# Patient Record
Sex: FEMALE | Race: WHITE | NOT HISPANIC OR LATINO | Employment: FULL TIME | URBAN - METROPOLITAN AREA
[De-identification: names, ages, dates, MRNs, and addresses within clinical notes are randomized per-mention and may not be internally consistent; named-entity substitution may affect disease eponyms.]

---

## 2018-09-10 ENCOUNTER — OFFICE VISIT (OUTPATIENT)
Dept: MEDICAL GROUP | Facility: PHYSICIAN GROUP | Age: 23
End: 2018-09-10
Payer: COMMERCIAL

## 2018-09-10 VITALS
TEMPERATURE: 96.6 F | HEART RATE: 77 BPM | HEIGHT: 63 IN | BODY MASS INDEX: 31.89 KG/M2 | RESPIRATION RATE: 12 BRPM | SYSTOLIC BLOOD PRESSURE: 102 MMHG | OXYGEN SATURATION: 97 % | WEIGHT: 180 LBS | DIASTOLIC BLOOD PRESSURE: 60 MMHG

## 2018-09-10 DIAGNOSIS — E66.9 OBESITY (BMI 30-39.9): ICD-10-CM

## 2018-09-10 DIAGNOSIS — J40 BRONCHITIS: ICD-10-CM

## 2018-09-10 PROCEDURE — 99204 OFFICE O/P NEW MOD 45 MIN: CPT | Performed by: FAMILY MEDICINE

## 2018-09-10 RX ORDER — BENZONATATE 100 MG/1
100 CAPSULE ORAL 3 TIMES DAILY PRN
Qty: 60 CAP | Refills: 0 | Status: SHIPPED | OUTPATIENT
Start: 2018-09-10 | End: 2020-02-18

## 2018-09-10 RX ORDER — DOXYCYCLINE HYCLATE 100 MG
100 TABLET ORAL 2 TIMES DAILY
Qty: 20 TAB | Refills: 0 | Status: SHIPPED | OUTPATIENT
Start: 2018-09-10 | End: 2020-03-03

## 2018-09-10 RX ORDER — METHYLPREDNISOLONE 4 MG/1
TABLET ORAL
Qty: 21 TAB | Refills: 0 | Status: SHIPPED | OUTPATIENT
Start: 2018-09-10 | End: 2020-02-18

## 2018-09-10 RX ORDER — ALBUTEROL SULFATE 90 UG/1
2 AEROSOL, METERED RESPIRATORY (INHALATION) EVERY 6 HOURS PRN
COMMUNITY
End: 2018-10-09 | Stop reason: SDUPTHER

## 2018-09-10 ASSESSMENT — ENCOUNTER SYMPTOMS
GASTROINTESTINAL NEGATIVE: 1
BLURRED VISION: 0
DOUBLE VISION: 0
HEMOPTYSIS: 0
HEARTBURN: 0
SHORTNESS OF BREATH: 1
DIZZINESS: 0
NEUROLOGICAL NEGATIVE: 1
CARDIOVASCULAR NEGATIVE: 1
MYALGIAS: 0
WHEEZING: 1
BRUISES/BLEEDS EASILY: 0
CHILLS: 0
FEVER: 0
COUGH: 1
HEADACHES: 0
MUSCULOSKELETAL NEGATIVE: 1
NAUSEA: 0
PALPITATIONS: 0
DEPRESSION: 0
RHINORRHEA: 1
CONSTITUTIONAL NEGATIVE: 1
TINGLING: 0
PSYCHIATRIC NEGATIVE: 1
EYES NEGATIVE: 1

## 2018-09-10 ASSESSMENT — PATIENT HEALTH QUESTIONNAIRE - PHQ9: CLINICAL INTERPRETATION OF PHQ2 SCORE: 0

## 2018-09-10 NOTE — LETTER
[facility]     September 10, 2018    Patient: Zaynab Galarza   YOB: 1995   Date of Visit: 9/10/2018       To Whom It May Concern:    Zaynab Galarza was seen and treated in our department on     9/10/2018. Please excuse Zaynab from work on 09/10/18 until     09/12/18. Zaynab is able to return on 09/12/18 if feeling better,     If not then return date will be no later than 09/13/18.        Sincerely,           Dr. Broderick Stevens

## 2018-09-11 NOTE — PROGRESS NOTES
Subjective:      Zaynab Galarza is a 23 y.o. female who presents with Pharyngitis and Cold Exposure            History of asthma now with productive cough will treat    1. Bronchitis    - albuterol 108 (90 Base) MCG/ACT Aero Soln inhalation aerosol; Inhale 2 Puffs by mouth every 6 hours as needed for Shortness of Breath.  - doxycycline (VIBRAMYCIN) 100 MG Tab; Take 1 Tab by mouth 2 times a day.  Dispense: 20 Tab; Refill: 0  - MethylPREDNISolone (MEDROL DOSEPAK) 4 MG Tablet Therapy Pack; As directed on the packaging label.  Dispense: 21 Tab; Refill: 0  - benzonatate (TESSALON) 100 MG Cap; Take 1 Cap by mouth 3 times a day as needed for Cough.  Dispense: 60 Cap; Refill: 0    2. Obesity (BMI 30-39.9)    - Patient identified as having weight management issue.  Appropriate orders and counseling given.    Past Medical History:  No date: Asthma  Past Surgical History:  No date: TONSILLECTOMY  Smoking status: Never Smoker                                                              Smokeless tobacco: Never Used                      Alcohol use: No              History reviewed.  No pertinent family history.      Current Outpatient Prescriptions: •  albuterol 108 (90 Base) MCG/ACT Aero Soln inhalation aerosol, Inhale 2 Puffs by mouth every 6 hours as needed for Shortness of Breath., Disp: , Rfl: •  doxycycline (VIBRAMYCIN) 100 MG Tab, Take 1 Tab by mouth 2 times a day., Disp: 20 Tab, Rfl: 0•  MethylPREDNISolone (MEDROL DOSEPAK) 4 MG Tablet Therapy Pack, As directed on the packaging label., Disp: 21 Tab, Rfl: 0•  benzonatate (TESSALON) 100 MG Cap, Take 1 Cap by mouth 3 times a day as needed for Cough., Disp: 60 Cap, Rfl: 0    Patient was instructed on the use of medications, either prescriptions or OTC and informed on when the appropriate follow up time period should be. In addition, patient was also instructed that should any acute worsening occur that they should notify this clinic asap or call 911.          Cough   This is  "a recurrent problem. The current episode started in the past 7 days. The problem has been unchanged. The problem occurs every few minutes. The cough is productive of sputum. Associated symptoms include rhinorrhea, shortness of breath and wheezing. Pertinent negatives include no chest pain, chills, fever, headaches, heartburn, hemoptysis, myalgias or rash. Nothing aggravates the symptoms. She has tried a beta-agonist inhaler for the symptoms. The treatment provided mild relief. Her past medical history is significant for asthma.       Review of Systems   Constitutional: Negative.  Negative for chills and fever.   HENT: Positive for rhinorrhea. Negative for hearing loss.    Eyes: Negative.  Negative for blurred vision and double vision.   Respiratory: Positive for cough, shortness of breath and wheezing. Negative for hemoptysis.    Cardiovascular: Negative.  Negative for chest pain and palpitations.   Gastrointestinal: Negative.  Negative for heartburn and nausea.   Genitourinary: Negative.  Negative for dysuria.   Musculoskeletal: Negative.  Negative for myalgias.   Skin: Negative.  Negative for rash.   Neurological: Negative.  Negative for dizziness, tingling and headaches.   Endo/Heme/Allergies: Negative.  Does not bruise/bleed easily.   Psychiatric/Behavioral: Negative.  Negative for depression and suicidal ideas.   All other systems reviewed and are negative.         Objective:     /60   Pulse 77   Temp 35.9 °C (96.6 °F)   Resp 12   Ht 1.6 m (5' 3\")   Wt 81.6 kg (180 lb)   SpO2 97%   BMI 31.89 kg/m²      Physical Exam   Constitutional: She is oriented to person, place, and time. She appears well-developed and well-nourished. No distress.   HENT:   Head: Normocephalic and atraumatic.   Mouth/Throat: Oropharynx is clear and moist. No oropharyngeal exudate.   Eyes: Pupils are equal, round, and reactive to light.   Cardiovascular: Normal rate, regular rhythm, normal heart sounds and intact distal pulses. "  Exam reveals no gallop and no friction rub.    No murmur heard.  Pulmonary/Chest: Effort normal and breath sounds normal. No respiratory distress. She has no wheezes. She has no rales. She exhibits no tenderness.   Patient today has a loose cough but on auscultation has no evident wheezes,rales or rhonchi     Neurological: She is alert and oriented to person, place, and time.   Skin: She is not diaphoretic.   Psychiatric: She has a normal mood and affect. Her behavior is normal. Judgment and thought content normal.   Nursing note and vitals reviewed.              Assessment/Plan:     1. Bronchitis    - albuterol 108 (90 Base) MCG/ACT Aero Soln inhalation aerosol; Inhale 2 Puffs by mouth every 6 hours as needed for Shortness of Breath.  - doxycycline (VIBRAMYCIN) 100 MG Tab; Take 1 Tab by mouth 2 times a day.  Dispense: 20 Tab; Refill: 0  - MethylPREDNISolone (MEDROL DOSEPAK) 4 MG Tablet Therapy Pack; As directed on the packaging label.  Dispense: 21 Tab; Refill: 0  - benzonatate (TESSALON) 100 MG Cap; Take 1 Cap by mouth 3 times a day as needed for Cough.  Dispense: 60 Cap; Refill: 0    2. Obesity (BMI 30-39.9)    - Patient identified as having weight management issue.  Appropriate orders and counseling given.

## 2018-09-13 ENCOUNTER — HOSPITAL ENCOUNTER (OUTPATIENT)
Dept: HOSPITAL 8 - CFH | Age: 23
Discharge: HOME | End: 2018-09-13
Payer: COMMERCIAL

## 2018-09-13 DIAGNOSIS — N83.201: Primary | ICD-10-CM

## 2018-09-13 PROCEDURE — 76830 TRANSVAGINAL US NON-OB: CPT

## 2018-10-09 ENCOUNTER — OFFICE VISIT (OUTPATIENT)
Dept: MEDICAL GROUP | Facility: MEDICAL CENTER | Age: 23
End: 2018-10-09
Payer: COMMERCIAL

## 2018-10-09 VITALS
HEART RATE: 70 BPM | TEMPERATURE: 97.2 F | SYSTOLIC BLOOD PRESSURE: 100 MMHG | HEIGHT: 63 IN | WEIGHT: 178.79 LBS | BODY MASS INDEX: 31.68 KG/M2 | DIASTOLIC BLOOD PRESSURE: 66 MMHG | OXYGEN SATURATION: 95 %

## 2018-10-09 DIAGNOSIS — J40 BRONCHITIS: ICD-10-CM

## 2018-10-09 DIAGNOSIS — Z23 NEED FOR INFLUENZA VACCINATION: ICD-10-CM

## 2018-10-09 DIAGNOSIS — J45.31 MILD PERSISTENT ASTHMA WITH ACUTE EXACERBATION: ICD-10-CM

## 2018-10-09 PROCEDURE — 90686 IIV4 VACC NO PRSV 0.5 ML IM: CPT | Performed by: PHYSICIAN ASSISTANT

## 2018-10-09 PROCEDURE — 99214 OFFICE O/P EST MOD 30 MIN: CPT | Mod: 25 | Performed by: PHYSICIAN ASSISTANT

## 2018-10-09 PROCEDURE — 90471 IMMUNIZATION ADMIN: CPT | Performed by: PHYSICIAN ASSISTANT

## 2018-10-09 RX ORDER — NORETHINDRONE ACETATE AND ETHINYL ESTRADIOL, AND FERROUS FUMARATE 1MG-20(24)
KIT ORAL
COMMUNITY
End: 2020-03-13

## 2018-10-09 RX ORDER — ALBUTEROL SULFATE 90 UG/1
2 AEROSOL, METERED RESPIRATORY (INHALATION) EVERY 6 HOURS PRN
Qty: 8.5 G | Refills: 11 | Status: SHIPPED
Start: 2018-10-09 | End: 2020-02-18

## 2018-10-09 RX ORDER — MONTELUKAST SODIUM 10 MG/1
10 TABLET ORAL DAILY
Qty: 60 TAB | Refills: 5 | Status: SHIPPED | OUTPATIENT
Start: 2018-10-09 | End: 2020-03-13

## 2018-10-09 RX ORDER — ALBUTEROL SULFATE 2.5 MG/3ML
2.5 SOLUTION RESPIRATORY (INHALATION) EVERY 4 HOURS PRN
Qty: 30 BULLET | Refills: 2 | Status: SHIPPED
Start: 2018-10-09 | End: 2020-02-18

## 2018-10-09 NOTE — PROGRESS NOTES
Subjective:   CC:   Chief Complaint   Patient presents with   • Establish Care     Recurring Bronchitis, completed abx, discuss treatment       Zaynab Galarza is a 23 y.o. female here today for establishing care. She works at a days.      HPI:  Patient is here to discuss asthma bronchitis which are new problems to me.  She has asthma and is currently taking albuterol  1-2 times a day 3-5 days wk. She feels chest tightness and not being able to grasp enough air and therefore she uses her inhaler.  States exercising and cold makes her symptoms worse.  She also gets bronchitis about 4 times a year.  No chest tightness.  She also has a  Nebulizer at home that uses with asthma exacerbation.Her mom is a respiratory therapist.          current medicines (including changes today)  Current Outpatient Prescriptions   Medication Sig Dispense Refill   • Norethin Ace-Eth Estrad-FE (TAYTULLA) 1-20 MG-MCG(24) Cap Take  by mouth.     • montelukast (SINGULAIR) 10 MG Tab Take 1 Tab by mouth every day. 60 Tab 5   • albuterol (PROVENTIL) 2.5mg/3ml Nebu Soln solution for nebulization 3 mL by Nebulization route every four hours as needed for Shortness of Breath. 30 Bullet 2   • albuterol 108 (90 Base) MCG/ACT Aero Soln inhalation aerosol Inhale 2 Puffs by mouth every 6 hours as needed for Shortness of Breath. 8.5 g 11   • doxycycline (VIBRAMYCIN) 100 MG Tab Take 1 Tab by mouth 2 times a day. 20 Tab 0   • MethylPREDNISolone (MEDROL DOSEPAK) 4 MG Tablet Therapy Pack As directed on the packaging label. 21 Tab 0   • benzonatate (TESSALON) 100 MG Cap Take 1 Cap by mouth 3 times a day as needed for Cough. 60 Cap 0     No current facility-administered medications for this visit.          Past medical, surgical, family, and social history are reviewed in Epic chart by me today.   Medications and allergies reviewed in Epic chart by me today.         ROS   No chest pain, no shortness of breath, no abdominal pain  As documented in history of  "present illness above     Objective:     Blood pressure 100/66, pulse 70, temperature 36.2 °C (97.2 °F), temperature source Temporal, height 1.6 m (5' 3\"), weight 81.1 kg (178 lb 12.7 oz), last menstrual period 10/04/2018, SpO2 95 %, not currently breastfeeding. Body mass index is 31.67 kg/m².  Her mom is a respiratory therapist and patient also has a  Physical Exam:  Constitutional: Alert, oriented in no acute distress.  Psych: Eye contact is good, speech goal directed, affect calm  Eyes: Conjunctiva non-injected, sclera non-icteric.  ENMT: Ears:Pinna normal. TM pearly gray.               Lips without lesions, Clear oropharynx, mucous membranes pink and moist.  Neck: No cervical or supraclavicular lymphadenopathy,Trachea midline, no thyromegaly, no masses  Lungs: Unlabored respiratory effort, clear to auscultation bilaterally with good excursion, no wheez or rhonci  CV: regular rate and rhythm. No lower extremity edema  Abdomen: soft, nontender, No CVAT  Skin: no lesions in visible areas.  Ext: no edema, color normal, vascularity normal, temperature normal        Assessment and Plan:   The following treatment plan was discussed    1. Need for influenza vaccination    - Flu Quad Inj >3 Year Pre-Filled (Preservative Free)    2. Mild persistent asthma with acute exacerbation  Discussed with patient that seems like her asthma is not controlled if she uses her albuterol 3-5 times a week .  We are going to add a daily medicine.  - montelukast (SINGULAIR) 10 MG Tab; Take 1 Tab by mouth every day.  Dispense: 60 Tab; Refill: 5  - albuterol (PROVENTIL) 2.5mg/3ml Nebu Soln solution for nebulization; 3 mL by Nebulization route every four hours as needed for Shortness of Breath.  Dispense: 30 Bullet; Refill: 2    3. Bronchitis  - albuterol 108 (90 Base) MCG/ACT Aero Soln inhalation aerosol; Inhale 2 Puffs by mouth every 6 hours as needed for Shortness of Breath.  Dispense: 8.5 g; Refill: 11      Dr. Carias ordered blood " work for her.     Followup: Return if symptoms worsen or fail to improve.         Please note that this dictation was created using voice recognition software. I have made every reasonable attempt to correct obvious errors, but I expect that there are errors of grammar and possibly content that I did not discover before finalizing the note.

## 2019-07-18 ENCOUNTER — HOSPITAL ENCOUNTER (EMERGENCY)
Dept: HOSPITAL 8 - ED | Age: 24
Discharge: HOME | End: 2019-07-18
Payer: COMMERCIAL

## 2019-07-18 VITALS — DIASTOLIC BLOOD PRESSURE: 59 MMHG | SYSTOLIC BLOOD PRESSURE: 106 MMHG

## 2019-07-18 VITALS — WEIGHT: 179.24 LBS | HEIGHT: 65 IN | BODY MASS INDEX: 29.86 KG/M2

## 2019-07-18 DIAGNOSIS — R10.2: Primary | ICD-10-CM

## 2019-07-18 LAB
ALBUMIN SERPL-MCNC: 3.9 G/DL (ref 3.4–5)
ALP SERPL-CCNC: 82 U/L (ref 45–117)
ALT SERPL-CCNC: 29 U/L (ref 12–78)
ANION GAP SERPL CALC-SCNC: 5 MMOL/L (ref 5–15)
BASOPHILS # BLD AUTO: 0.06 X10^3/UL (ref 0–0.1)
BASOPHILS NFR BLD AUTO: 1 % (ref 0–1)
BILIRUB SERPL-MCNC: 0.4 MG/DL (ref 0.2–1)
CALCIUM SERPL-MCNC: 8.7 MG/DL (ref 8.5–10.1)
CHLORIDE SERPL-SCNC: 111 MMOL/L (ref 98–107)
CREAT SERPL-MCNC: 0.56 MG/DL (ref 0.55–1.02)
CULTURE INDICATED?: YES
EOSINOPHIL # BLD AUTO: 0.19 X10^3/UL (ref 0–0.4)
EOSINOPHIL NFR BLD AUTO: 2 % (ref 1–7)
ERYTHROCYTE [DISTWIDTH] IN BLOOD BY AUTOMATED COUNT: 12.8 % (ref 9.6–15.2)
HCG UR SG: >= 1.03 (ref 1–1.03)
LYMPHOCYTES # BLD AUTO: 3.44 X10^3/UL (ref 1–3.4)
LYMPHOCYTES NFR BLD AUTO: 42 % (ref 22–44)
MCH RBC QN AUTO: 31.8 PG (ref 27–34.8)
MCHC RBC AUTO-ENTMCNC: 33.4 G/DL (ref 32.4–35.8)
MCV RBC AUTO: 95.3 FL (ref 80–100)
MD: NO
MICROSCOPIC: (no result)
MONOCYTES # BLD AUTO: 0.62 X10^3/UL (ref 0.2–0.8)
MONOCYTES NFR BLD AUTO: 8 % (ref 2–9)
NEUTROPHILS # BLD AUTO: 3.88 X10^3/UL (ref 1.8–6.8)
NEUTROPHILS NFR BLD AUTO: 47 % (ref 42–75)
PLATELET # BLD AUTO: 270 X10^3/UL (ref 130–400)
PMV BLD AUTO: 8.9 FL (ref 7.4–10.4)
PROT SERPL-MCNC: 7.4 G/DL (ref 6.4–8.2)
RBC # BLD AUTO: 4.44 X10^6/UL (ref 3.82–5.3)

## 2019-07-18 PROCEDURE — 99284 EMERGENCY DEPT VISIT MOD MDM: CPT

## 2019-07-18 PROCEDURE — 85025 COMPLETE CBC W/AUTO DIFF WBC: CPT

## 2019-07-18 PROCEDURE — 76830 TRANSVAGINAL US NON-OB: CPT

## 2019-07-18 PROCEDURE — 81025 URINE PREGNANCY TEST: CPT

## 2019-07-18 PROCEDURE — 81001 URINALYSIS AUTO W/SCOPE: CPT

## 2019-07-18 PROCEDURE — 87086 URINE CULTURE/COLONY COUNT: CPT

## 2019-07-18 PROCEDURE — 80053 COMPREHEN METABOLIC PANEL: CPT

## 2019-07-18 PROCEDURE — 36415 COLL VENOUS BLD VENIPUNCTURE: CPT

## 2019-11-08 ENCOUNTER — OFFICE VISIT (OUTPATIENT)
Dept: MEDICAL GROUP | Facility: MEDICAL CENTER | Age: 24
End: 2019-11-08
Payer: COMMERCIAL

## 2019-11-08 VITALS
RESPIRATION RATE: 18 BRPM | OXYGEN SATURATION: 98 % | HEIGHT: 63 IN | TEMPERATURE: 97 F | WEIGHT: 179.9 LBS | SYSTOLIC BLOOD PRESSURE: 122 MMHG | HEART RATE: 74 BPM | BODY MASS INDEX: 31.88 KG/M2 | DIASTOLIC BLOOD PRESSURE: 80 MMHG

## 2019-11-08 DIAGNOSIS — R09.81 SINUS CONGESTION: ICD-10-CM

## 2019-11-08 DIAGNOSIS — F32.A DEPRESSION, UNSPECIFIED DEPRESSION TYPE: ICD-10-CM

## 2019-11-08 DIAGNOSIS — F41.9 ANXIETY: ICD-10-CM

## 2019-11-08 PROCEDURE — 99214 OFFICE O/P EST MOD 30 MIN: CPT | Performed by: PHYSICIAN ASSISTANT

## 2019-11-08 RX ORDER — FLUTICASONE PROPIONATE 50 MCG
1 SPRAY, SUSPENSION (ML) NASAL DAILY
Qty: 16 G | Refills: 0 | Status: SHIPPED
Start: 2019-11-08 | End: 2020-02-18

## 2019-11-08 ASSESSMENT — PATIENT HEALTH QUESTIONNAIRE - PHQ9
CLINICAL INTERPRETATION OF PHQ2 SCORE: 3
SUM OF ALL RESPONSES TO PHQ QUESTIONS 1-9: 13
5. POOR APPETITE OR OVEREATING: 1 - SEVERAL DAYS
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
## 2019-11-12 ENCOUNTER — APPOINTMENT (OUTPATIENT)
Dept: MEDICAL GROUP | Facility: MEDICAL CENTER | Age: 24
End: 2019-11-12
Payer: COMMERCIAL

## 2019-11-19 ENCOUNTER — OFFICE VISIT (OUTPATIENT)
Dept: MEDICAL GROUP | Facility: MEDICAL CENTER | Age: 24
End: 2019-11-19
Payer: COMMERCIAL

## 2019-11-19 VITALS
OXYGEN SATURATION: 98 % | WEIGHT: 180.34 LBS | DIASTOLIC BLOOD PRESSURE: 78 MMHG | SYSTOLIC BLOOD PRESSURE: 118 MMHG | HEART RATE: 76 BPM | BODY MASS INDEX: 31.95 KG/M2 | TEMPERATURE: 98.1 F | RESPIRATION RATE: 18 BRPM | HEIGHT: 63 IN

## 2019-11-19 DIAGNOSIS — Z00.00 PREVENTATIVE HEALTH CARE: ICD-10-CM

## 2019-11-19 DIAGNOSIS — F33.1 MODERATE EPISODE OF RECURRENT MAJOR DEPRESSIVE DISORDER (HCC): ICD-10-CM

## 2019-11-19 DIAGNOSIS — Z23 NEED FOR VACCINATION: ICD-10-CM

## 2019-11-19 DIAGNOSIS — F41.1 GAD (GENERALIZED ANXIETY DISORDER): ICD-10-CM

## 2019-11-19 DIAGNOSIS — F43.10 PTSD (POST-TRAUMATIC STRESS DISORDER): ICD-10-CM

## 2019-11-19 PROCEDURE — 99214 OFFICE O/P EST MOD 30 MIN: CPT | Mod: 25 | Performed by: PHYSICIAN ASSISTANT

## 2019-11-19 PROCEDURE — 90686 IIV4 VACC NO PRSV 0.5 ML IM: CPT | Performed by: PHYSICIAN ASSISTANT

## 2019-11-19 PROCEDURE — 90651 9VHPV VACCINE 2/3 DOSE IM: CPT | Performed by: PHYSICIAN ASSISTANT

## 2019-11-19 PROCEDURE — 90471 IMMUNIZATION ADMIN: CPT | Performed by: PHYSICIAN ASSISTANT

## 2019-11-19 PROCEDURE — 90472 IMMUNIZATION ADMIN EACH ADD: CPT | Performed by: PHYSICIAN ASSISTANT

## 2019-11-19 PROCEDURE — 90732 PPSV23 VACC 2 YRS+ SUBQ/IM: CPT | Performed by: PHYSICIAN ASSISTANT

## 2019-11-19 PROCEDURE — 90715 TDAP VACCINE 7 YRS/> IM: CPT | Performed by: PHYSICIAN ASSISTANT

## 2019-11-19 RX ORDER — ESCITALOPRAM OXALATE 10 MG/1
10 TABLET ORAL DAILY
Qty: 30 TAB | Refills: 1 | Status: SHIPPED | OUTPATIENT
Start: 2019-11-19 | End: 2019-12-17
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
## 2019-11-19 NOTE — LETTER
November 19, 2019         Patient: Zaynab Galarza   YOB: 1995   Date of Visit: 11/19/2019           To Whom it May Concern:    Zaynab Galarza was seen in my clinic on 11/19/2019. She can benefit from emotional support animal to help with her symptoms and quality of life. Please accommodate for her pets, 2 dogs.    If you have any questions or concerns, please don't hesitate to call.      Sincerely,         Meg Jo P.A.-C.  Electronically Signed

## 2019-12-17 ENCOUNTER — OFFICE VISIT (OUTPATIENT)
Dept: MEDICAL GROUP | Facility: MEDICAL CENTER | Age: 24
End: 2019-12-17
Payer: COMMERCIAL

## 2019-12-17 VITALS
SYSTOLIC BLOOD PRESSURE: 118 MMHG | HEART RATE: 68 BPM | DIASTOLIC BLOOD PRESSURE: 78 MMHG | HEIGHT: 63 IN | RESPIRATION RATE: 18 BRPM | WEIGHT: 178.79 LBS | BODY MASS INDEX: 31.68 KG/M2 | TEMPERATURE: 97 F | OXYGEN SATURATION: 98 %

## 2019-12-17 DIAGNOSIS — R19.7 NAUSEA VOMITING AND DIARRHEA: ICD-10-CM

## 2019-12-17 DIAGNOSIS — K52.9 GASTROENTERITIS: ICD-10-CM

## 2019-12-17 DIAGNOSIS — R11.0 NAUSEA: ICD-10-CM

## 2019-12-17 DIAGNOSIS — F41.1 GAD (GENERALIZED ANXIETY DISORDER): ICD-10-CM

## 2019-12-17 DIAGNOSIS — R19.05 PERIUMBILIC SWELLING, MASS OR LUMP: ICD-10-CM

## 2019-12-17 DIAGNOSIS — R11.2 NAUSEA VOMITING AND DIARRHEA: ICD-10-CM

## 2019-12-17 PROCEDURE — 99214 OFFICE O/P EST MOD 30 MIN: CPT | Performed by: PHYSICIAN ASSISTANT

## 2019-12-17 RX ORDER — ESCITALOPRAM OXALATE 20 MG/1
20 TABLET ORAL DAILY
Qty: 30 TAB | Refills: 1 | Status: SHIPPED
Start: 2019-12-17 | End: 2020-01-28

## 2019-12-17 RX ORDER — ONDANSETRON 4 MG/1
4 TABLET, ORALLY DISINTEGRATING ORAL EVERY 6 HOURS PRN
Qty: 10 TAB | Refills: 0 | Status: SHIPPED
Start: 2019-12-17 | End: 2020-02-18

## 2019-12-17 NOTE — PROGRESS NOTES
"Chief Complaint   Patient presents with   • Anxiety     follow up   • Depression     follow up       HPI  Zaynab Galarza is a 24 y.o. female here today for:    Follow-up on anxiety.  Patient is currently taking Lexapro 10 mg daily.  States that she thinks her symptoms has improved at the beginning however now she started having anxiety on a daily basis again.  No side effects on Lexapro.    Patient complains of abdominal pain and nausea vomiting which is a new problem.  Started abdominal pain around belly bottom yesterday, had nausea with one episode of vomiting yesterday morning.  Also has had diarrhea.  Yesterday he had abdominal cramping and felt her abdomen got rigid and then she felt a bump around the umbilicus which was painful.  Abdominal pain cramp has improved, still feels tender around umbilicus.  No blood in the stool, no blood in vomit.  No traveling or camping.    Past medical, surgical, family, and social history is reviewed in Epic chart by me today.   Medications and allergies reviewed and updated in Epic chart by me today.     ROS:   As documented in history of present illness above    Exam:  /78 (BP Location: Right arm, Patient Position: Sitting, BP Cuff Size: Adult)   Pulse 68   Temp 36.1 °C (97 °F) (Temporal)   Resp 18   Ht 1.588 m (5' 2.5\")   Wt 81.1 kg (178 lb 12.7 oz)   SpO2 98%   Constitutional: Alert, no distress, plus 3 vital signs  Skin:  Warm, dry, no rashes invisible areas  Respiratory: Unlabored respiratory effort, lungs clear to auscultation, no wheezes, no rhonchi  Cardiovascular: RRR, no murmur, no lower extremities edema, pedal pulses equal bilaterally and 2+/4   Abdomen: Soft, nontender, no masses or hepatosplenomegaly, no lumps palpated around umbilicus,   Psych: Alert, pleasant, well-groomed, normal affect    A/P:    1. Nausea      2. Nausea vomiting and diarrhea    - ondansetron (ZOFRAN ODT) 4 MG TABLET DISPERSIBLE; Take 1 Tab by mouth every 6 hours as needed for " Nausea.  Dispense: 10 Tab; Refill: 0    3. Gastroenteritis    - ondansetron (ZOFRAN ODT) 4 MG TABLET DISPERSIBLE; Take 1 Tab by mouth every 6 hours as needed for Nausea.  Dispense: 10 Tab; Refill: 0    4. Periumbilic swelling, mass or lump  Advised patient to do ultrasound to evaluate for possible hernia if pain persists after 3 to 5 days.  - US-HERNIA ABDOMEN; Future    5. MONICA (generalized anxiety disorder)  - escitalopram (LEXAPRO) 20 MG tablet; Take 1 Tab by mouth every day.  Dispense: 30 Tab; Refill: 1      We discussed red flags incuding worsening pain, N,V, o her office note for the genetic test ordered from June r overall decline in health. Patient verbalize understanding and will either call our office or present to the emergency room.      not applicable

## 2019-12-18 ENCOUNTER — HOSPITAL ENCOUNTER (OUTPATIENT)
Dept: RADIOLOGY | Facility: MEDICAL CENTER | Age: 24
End: 2019-12-18
Attending: PHYSICIAN ASSISTANT
Payer: COMMERCIAL

## 2019-12-18 ENCOUNTER — TELEPHONE (OUTPATIENT)
Dept: MEDICAL GROUP | Facility: MEDICAL CENTER | Age: 24
End: 2019-12-18

## 2019-12-18 DIAGNOSIS — R19.05 PERIUMBILIC SWELLING, MASS OR LUMP: ICD-10-CM

## 2019-12-18 PROCEDURE — 76705 ECHO EXAM OF ABDOMEN: CPT

## 2019-12-18 NOTE — TELEPHONE ENCOUNTER
----- Message from Meg Jo P.A.-C. sent at 12/18/2019  2:41 PM PST -----  Please inform patient that   Ultrasound was negative for any umbilical hernia.    Meg Jo P.A.-C.

## 2020-01-22 ENCOUNTER — HOSPITAL ENCOUNTER (EMERGENCY)
Dept: HOSPITAL 8 - ED | Age: 25
Discharge: HOME | End: 2020-01-22
Payer: COMMERCIAL

## 2020-01-22 VITALS — HEIGHT: 62 IN | WEIGHT: 185.41 LBS | BODY MASS INDEX: 34.12 KG/M2

## 2020-01-22 VITALS — SYSTOLIC BLOOD PRESSURE: 137 MMHG | DIASTOLIC BLOOD PRESSURE: 72 MMHG

## 2020-01-22 DIAGNOSIS — F43.10: ICD-10-CM

## 2020-01-22 DIAGNOSIS — J45.909: ICD-10-CM

## 2020-01-22 DIAGNOSIS — R10.33: Primary | ICD-10-CM

## 2020-01-22 DIAGNOSIS — I88.0: ICD-10-CM

## 2020-01-22 LAB
ALBUMIN SERPL-MCNC: 3.8 G/DL (ref 3.4–5)
ALP SERPL-CCNC: 87 U/L (ref 45–117)
ALT SERPL-CCNC: 26 U/L (ref 12–78)
ANION GAP SERPL CALC-SCNC: 7 MMOL/L (ref 5–15)
BASOPHILS # BLD AUTO: 0.05 X10^3/UL (ref 0–0.1)
BASOPHILS NFR BLD AUTO: 1 % (ref 0–1)
BILIRUB SERPL-MCNC: 0.2 MG/DL (ref 0.2–1)
CALCIUM SERPL-MCNC: 9.2 MG/DL (ref 8.5–10.1)
CHLORIDE SERPL-SCNC: 108 MMOL/L (ref 98–107)
CREAT SERPL-MCNC: 0.67 MG/DL (ref 0.55–1.02)
CULTURE INDICATED?: NO
EOSINOPHIL # BLD AUTO: 0.18 X10^3/UL (ref 0–0.4)
EOSINOPHIL NFR BLD AUTO: 3 % (ref 1–7)
ERYTHROCYTE [DISTWIDTH] IN BLOOD BY AUTOMATED COUNT: 12.9 % (ref 9.6–15.2)
LYMPHOCYTES # BLD AUTO: 2.13 X10^3/UL (ref 1–3.4)
LYMPHOCYTES NFR BLD AUTO: 31 % (ref 22–44)
MCH RBC QN AUTO: 31.6 PG (ref 27–34.8)
MCHC RBC AUTO-ENTMCNC: 33.5 G/DL (ref 32.4–35.8)
MCV RBC AUTO: 94.3 FL (ref 80–100)
MD: NO
MICROSCOPIC: (no result)
MONOCYTES # BLD AUTO: 0.77 X10^3/UL (ref 0.2–0.8)
MONOCYTES NFR BLD AUTO: 11 % (ref 2–9)
NEUTROPHILS # BLD AUTO: 3.67 X10^3/UL (ref 1.8–6.8)
NEUTROPHILS NFR BLD AUTO: 54 % (ref 42–75)
PLATELET # BLD AUTO: 283 X10^3/UL (ref 130–400)
PMV BLD AUTO: 8 FL (ref 7.4–10.4)
PROT SERPL-MCNC: 7.5 G/DL (ref 6.4–8.2)
RBC # BLD AUTO: 4.63 X10^6/UL (ref 3.82–5.3)

## 2020-01-22 PROCEDURE — 99284 EMERGENCY DEPT VISIT MOD MDM: CPT

## 2020-01-22 PROCEDURE — 85025 COMPLETE CBC W/AUTO DIFF WBC: CPT

## 2020-01-22 PROCEDURE — 81001 URINALYSIS AUTO W/SCOPE: CPT

## 2020-01-22 PROCEDURE — 84703 CHORIONIC GONADOTROPIN ASSAY: CPT

## 2020-01-22 PROCEDURE — 74021 RADEX ABDOMEN 3+ VIEWS: CPT

## 2020-01-22 PROCEDURE — 36415 COLL VENOUS BLD VENIPUNCTURE: CPT

## 2020-01-22 PROCEDURE — 74176 CT ABD & PELVIS W/O CONTRAST: CPT

## 2020-01-22 PROCEDURE — 80053 COMPREHEN METABOLIC PANEL: CPT

## 2020-01-22 NOTE — NUR
-------------------------------------------------------------------------------

          *** Note undone in [facility] - 01/22/20 at 1234 by [email or web address] ***           

-------------------------------------------------------------------------------

REPORT TO PRE-OP. PT TAKEN OFF FLOOR, PARENTS AT BEDSIDE.

## 2020-01-23 ENCOUNTER — OFFICE VISIT (OUTPATIENT)
Dept: URGENT CARE | Facility: CLINIC | Age: 25
End: 2020-01-23
Payer: COMMERCIAL

## 2020-01-23 VITALS
HEART RATE: 86 BPM | HEIGHT: 62 IN | WEIGHT: 178 LBS | SYSTOLIC BLOOD PRESSURE: 120 MMHG | TEMPERATURE: 98 F | OXYGEN SATURATION: 95 % | RESPIRATION RATE: 17 BRPM | DIASTOLIC BLOOD PRESSURE: 62 MMHG | BODY MASS INDEX: 32.76 KG/M2

## 2020-01-23 DIAGNOSIS — R11.2 NAUSEA AND VOMITING, INTRACTABILITY OF VOMITING NOT SPECIFIED, UNSPECIFIED VOMITING TYPE: ICD-10-CM

## 2020-01-23 DIAGNOSIS — R10.9 ABDOMINAL PAIN, UNSPECIFIED ABDOMINAL LOCATION: ICD-10-CM

## 2020-01-23 PROCEDURE — 99214 OFFICE O/P EST MOD 30 MIN: CPT | Performed by: NURSE PRACTITIONER

## 2020-01-23 RX ORDER — ONDANSETRON 2 MG/ML
4 INJECTION INTRAMUSCULAR; INTRAVENOUS ONCE
Status: COMPLETED | OUTPATIENT
Start: 2020-01-23 | End: 2020-01-23

## 2020-01-23 RX ORDER — ONDANSETRON 4 MG/1
4 TABLET, ORALLY DISINTEGRATING ORAL EVERY 8 HOURS PRN
Qty: 10 TAB | Refills: 0 | Status: SHIPPED
Start: 2020-01-23 | End: 2020-02-18

## 2020-01-23 RX ADMIN — ONDANSETRON 4 MG: 2 INJECTION INTRAMUSCULAR; INTRAVENOUS at 19:22

## 2020-01-23 ASSESSMENT — ENCOUNTER SYMPTOMS
CONSTIPATION: 1
CHILLS: 0
MYALGIAS: 0
NAUSEA: 1
BLOOD IN STOOL: 1
BELCHING: 0
SORE THROAT: 0
DIZZINESS: 0
EYE PAIN: 0
SHORTNESS OF BREATH: 0
VOMITING: 1
ABDOMINAL PAIN: 1
FEVER: 0
DIARRHEA: 1
ARTHRALGIAS: 0
ANOREXIA: 0
HEADACHES: 0

## 2020-01-24 NOTE — PROGRESS NOTES
"Subjective:   Zaynab Galarza  is a 24 y.o. female who presents for Abdominal Pain        Abdominal Pain   This is a new problem. The current episode started in the past 7 days (Was seen yesterday at [facility] emergency room in which a CT scan was complete indicating mesenteric adenitis). The onset quality is undetermined. The problem occurs constantly. The problem has been unchanged. The pain is located in the generalized abdominal region. The pain is at a severity of 5/10. The pain is moderate. The quality of the pain is cramping. The abdominal pain does not radiate. Associated symptoms include constipation, diarrhea, nausea and vomiting (  ). Pertinent negatives include no anorexia, arthralgias, belching, fever, frequency, headaches, hematuria, melena or myalgias. Nothing aggravates the pain. The pain is relieved by nothing. She has tried nothing for the symptoms. The treatment provided no relief. Prior diagnostic workup includes CT scan (Diagnosed with mesenteric adenitis).     Review of Systems   Constitutional: Negative for chills and fever.   HENT: Negative for sore throat.    Eyes: Negative for pain.   Respiratory: Negative for shortness of breath.    Cardiovascular: Negative for chest pain.   Gastrointestinal: Positive for abdominal pain, blood in stool, constipation, diarrhea, nausea and vomiting (  ). Negative for anorexia and melena.   Genitourinary: Negative for frequency and hematuria.   Musculoskeletal: Negative for arthralgias and myalgias.   Skin: Negative for rash.   Neurological: Negative for dizziness and headaches.     Allergies   Allergen Reactions   • Penicillins    • Zithromax [Azithromycin]       Objective:   /62   Pulse 86   Temp 36.7 °C (98 °F) (Temporal)   Resp 17   Ht 1.575 m (5' 2\")   Wt 80.7 kg (178 lb)   SpO2 95%   BMI 32.56 kg/m²   Physical Exam  Vitals signs and nursing note reviewed.   Constitutional:       General: She is not in acute distress.     Appearance: She is " well-developed.   HENT:      Head: Normocephalic and atraumatic.      Right Ear: Tympanic membrane and external ear normal.      Left Ear: Tympanic membrane and external ear normal.      Nose: Nose normal.      Right Sinus: No maxillary sinus tenderness or frontal sinus tenderness.      Left Sinus: No maxillary sinus tenderness or frontal sinus tenderness.      Mouth/Throat:      Mouth: Mucous membranes are moist.      Pharynx: Uvula midline. No posterior oropharyngeal erythema.      Tonsils: No tonsillar exudate or tonsillar abscesses.   Eyes:      General:         Right eye: No discharge.         Left eye: No discharge.      Conjunctiva/sclera: Conjunctivae normal.      Pupils: Pupils are equal, round, and reactive to light.   Cardiovascular:      Rate and Rhythm: Normal rate and regular rhythm.      Heart sounds: No murmur.   Pulmonary:      Effort: Pulmonary effort is normal. No respiratory distress.      Breath sounds: Normal breath sounds.   Abdominal:      General: Bowel sounds are normal. There is no distension.      Palpations: Abdomen is soft.      Tenderness: There is tenderness in the periumbilical area. There is no guarding or rebound.   Musculoskeletal: Normal range of motion.   Skin:     General: Skin is warm and dry.   Neurological:      General: No focal deficit present.      Mental Status: She is alert and oriented to person, place, and time. Mental status is at baseline.      Gait: Gait (gait at baseline) normal.   Psychiatric:         Judgment: Judgment normal.           Assessment/Plan:     1. Abdominal pain, unspecified abdominal location  REFERRAL TO GASTROENTEROLOGY    ondansetron (ZOFRAN) syringe/vial injection 4 mg    ondansetron (ZOFRAN ODT) 4 MG TABLET DISPERSIBLE   2. Nausea and vomiting, intractability of vomiting not specified, unspecified vomiting type     Patient is a 24-year-old female present with stated above.  Patient appears to be nontoxic, vital signs stable, no rebound or  guarding.  Patient having recent CT imaging complete at outside facility with diagnosis of mesenteric adenitis at this time will treat symptomatically with Zofran for the nausea and place urgent referral for follow-up to gastroenterology.  Recommended dietary modifications.  Patient and/or guardian given precautionary s/sx that mandate immediate follow up and evaluation in the ED. Advised of risks of not doing so.  Side effects of the above medications discussed.   DDX, Supportive care, and indications for immediate follow-up discussed with patient.    Instructed to return to clinic or nearest emergency department if we are not available for any change in condition, further concerns, or worsening of symptoms.    The patient and/or guardian demonstrated a good understanding and agreed with the treatment plan.    Please note that this dictation was created using voice recognition software. I have made every reasonable attempt to correct obvious errors, but I expect that there are errors of grammar and possibly content that I did not discover before finalizing the note.

## 2020-01-28 ENCOUNTER — OFFICE VISIT (OUTPATIENT)
Dept: BEHAVIORAL HEALTH | Facility: CLINIC | Age: 25
End: 2020-01-28
Payer: COMMERCIAL

## 2020-01-28 VITALS
HEIGHT: 62 IN | RESPIRATION RATE: 14 BRPM | BODY MASS INDEX: 34.04 KG/M2 | HEART RATE: 66 BPM | SYSTOLIC BLOOD PRESSURE: 130 MMHG | WEIGHT: 185 LBS | DIASTOLIC BLOOD PRESSURE: 89 MMHG

## 2020-01-28 DIAGNOSIS — F33.1 MDD (MAJOR DEPRESSIVE DISORDER), RECURRENT EPISODE, MODERATE (HCC): ICD-10-CM

## 2020-01-28 DIAGNOSIS — F43.10 PTSD (POST-TRAUMATIC STRESS DISORDER): ICD-10-CM

## 2020-01-28 DIAGNOSIS — F41.1 GAD (GENERALIZED ANXIETY DISORDER): ICD-10-CM

## 2020-01-28 PROCEDURE — 99204 OFFICE O/P NEW MOD 45 MIN: CPT | Performed by: NURSE PRACTITIONER

## 2020-01-28 RX ORDER — FLUOXETINE 20 MG/1
20 TABLET, FILM COATED ORAL DAILY
Qty: 30 TAB | Refills: 1 | Status: SHIPPED | OUTPATIENT
Start: 2020-01-28 | End: 2020-03-03 | Stop reason: SDUPTHER

## 2020-01-28 SDOH — HEALTH STABILITY: MENTAL HEALTH: HOW OFTEN DO YOU HAVE A DRINK CONTAINING ALCOHOL?: 2-4 TIMES A MONTH

## 2020-01-28 SDOH — HEALTH STABILITY: MENTAL HEALTH: HOW MANY STANDARD DRINKS CONTAINING ALCOHOL DO YOU HAVE ON A TYPICAL DAY?: 1 OR 2
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
## 2020-01-29 ENCOUNTER — HOSPITAL ENCOUNTER (OUTPATIENT)
Dept: LAB | Facility: MEDICAL CENTER | Age: 25
End: 2020-01-29
Attending: NURSE PRACTITIONER
Payer: COMMERCIAL

## 2020-01-29 DIAGNOSIS — F41.1 GAD (GENERALIZED ANXIETY DISORDER): ICD-10-CM

## 2020-01-29 DIAGNOSIS — F43.10 PTSD (POST-TRAUMATIC STRESS DISORDER): ICD-10-CM

## 2020-01-29 DIAGNOSIS — F33.1 MDD (MAJOR DEPRESSIVE DISORDER), RECURRENT EPISODE, MODERATE (HCC): ICD-10-CM

## 2020-01-29 LAB
25(OH)D3 SERPL-MCNC: 30 NG/ML (ref 30–100)
ALBUMIN SERPL BCP-MCNC: 4.4 G/DL (ref 3.2–4.9)
ALBUMIN/GLOB SERPL: 1.4 G/DL
ALP SERPL-CCNC: 63 U/L (ref 30–99)
ALT SERPL-CCNC: 17 U/L (ref 2–50)
ANION GAP SERPL CALC-SCNC: 10 MMOL/L (ref 0–11.9)
AST SERPL-CCNC: 18 U/L (ref 12–45)
BASOPHILS # BLD AUTO: 1 % (ref 0–1.8)
BASOPHILS # BLD: 0.07 K/UL (ref 0–0.12)
BILIRUB SERPL-MCNC: 0.7 MG/DL (ref 0.1–1.5)
BUN SERPL-MCNC: 13 MG/DL (ref 8–22)
CALCIUM SERPL-MCNC: 10 MG/DL (ref 8.5–10.5)
CHLORIDE SERPL-SCNC: 104 MMOL/L (ref 96–112)
CO2 SERPL-SCNC: 25 MMOL/L (ref 20–33)
CREAT SERPL-MCNC: 0.74 MG/DL (ref 0.5–1.4)
EOSINOPHIL # BLD AUTO: 0.21 K/UL (ref 0–0.51)
EOSINOPHIL NFR BLD: 3.1 % (ref 0–6.9)
ERYTHROCYTE [DISTWIDTH] IN BLOOD BY AUTOMATED COUNT: 43.2 FL (ref 35.9–50)
GLOBULIN SER CALC-MCNC: 3.2 G/DL (ref 1.9–3.5)
GLUCOSE SERPL-MCNC: 77 MG/DL (ref 65–99)
HCT VFR BLD AUTO: 43.9 % (ref 37–47)
HGB BLD-MCNC: 14.9 G/DL (ref 12–16)
IMM GRANULOCYTES # BLD AUTO: 0.01 K/UL (ref 0–0.11)
IMM GRANULOCYTES NFR BLD AUTO: 0.1 % (ref 0–0.9)
LYMPHOCYTES # BLD AUTO: 2.71 K/UL (ref 1–4.8)
LYMPHOCYTES NFR BLD: 39.4 % (ref 22–41)
MCH RBC QN AUTO: 31.8 PG (ref 27–33)
MCHC RBC AUTO-ENTMCNC: 33.9 G/DL (ref 33.6–35)
MCV RBC AUTO: 93.8 FL (ref 81.4–97.8)
MONOCYTES # BLD AUTO: 0.58 K/UL (ref 0–0.85)
MONOCYTES NFR BLD AUTO: 8.4 % (ref 0–13.4)
NEUTROPHILS # BLD AUTO: 3.29 K/UL (ref 2–7.15)
NEUTROPHILS NFR BLD: 48 % (ref 44–72)
NRBC # BLD AUTO: 0 K/UL
NRBC BLD-RTO: 0 /100 WBC
PLATELET # BLD AUTO: 200 K/UL (ref 164–446)
PMV BLD AUTO: 11.8 FL (ref 9–12.9)
POTASSIUM SERPL-SCNC: 4 MMOL/L (ref 3.6–5.5)
PROT SERPL-MCNC: 7.6 G/DL (ref 6–8.2)
RBC # BLD AUTO: 4.68 M/UL (ref 4.2–5.4)
SODIUM SERPL-SCNC: 139 MMOL/L (ref 135–145)
TSH SERPL DL<=0.005 MIU/L-ACNC: 1.46 UIU/ML (ref 0.38–5.33)
WBC # BLD AUTO: 6.9 K/UL (ref 4.8–10.8)

## 2020-01-29 PROCEDURE — 82306 VITAMIN D 25 HYDROXY: CPT

## 2020-01-29 PROCEDURE — 36415 COLL VENOUS BLD VENIPUNCTURE: CPT

## 2020-01-29 PROCEDURE — 85025 COMPLETE CBC W/AUTO DIFF WBC: CPT

## 2020-01-29 PROCEDURE — 84443 ASSAY THYROID STIM HORMONE: CPT

## 2020-01-29 PROCEDURE — 80053 COMPREHEN METABOLIC PANEL: CPT

## 2020-01-31 ENCOUNTER — HOSPITAL ENCOUNTER (OUTPATIENT)
Dept: LAB | Facility: MEDICAL CENTER | Age: 25
End: 2020-01-31
Attending: NURSE PRACTITIONER
Payer: COMMERCIAL

## 2020-01-31 LAB
H PYLORI AG STL QL IA: NOT DETECTED
HEMOCCULT STL QL: NEGATIVE

## 2020-01-31 PROCEDURE — 36415 COLL VENOUS BLD VENIPUNCTURE: CPT

## 2020-01-31 PROCEDURE — 87177 OVA AND PARASITES SMEARS: CPT

## 2020-01-31 PROCEDURE — 82784 ASSAY IGA/IGD/IGG/IGM EACH: CPT

## 2020-01-31 PROCEDURE — 83516 IMMUNOASSAY NONANTIBODY: CPT

## 2020-01-31 PROCEDURE — 87209 SMEAR COMPLEX STAIN: CPT

## 2020-01-31 PROCEDURE — 87046 STOOL CULTR AEROBIC BACT EA: CPT

## 2020-01-31 PROCEDURE — 82272 OCCULT BLD FECES 1-3 TESTS: CPT

## 2020-01-31 PROCEDURE — 87045 FECES CULTURE AEROBIC BACT: CPT

## 2020-01-31 PROCEDURE — 83993 ASSAY FOR CALPROTECTIN FECAL: CPT

## 2020-01-31 PROCEDURE — 87338 HPYLORI STOOL AG IA: CPT

## 2020-02-01 LAB — IGA SERPL-MCNC: 162 MG/DL (ref 68–408)

## 2020-02-02 LAB — CALPROTECTIN STL-MCNT: <16 UG/G

## 2020-02-03 LAB
BACTERIA STL CULT: NORMAL
GLIADIN PEPTIDE+TTG IGA+IGG SER QL IA: 6 UNITS (ref 0–19)
SIGNIFICANT IND 70042: NORMAL
SITE SITE: NORMAL
SOURCE SOURCE: NORMAL

## 2020-02-05 ENCOUNTER — OFFICE VISIT (OUTPATIENT)
Dept: BEHAVIORAL HEALTH | Facility: CLINIC | Age: 25
End: 2020-02-05
Payer: COMMERCIAL

## 2020-02-05 ENCOUNTER — OCCUPATIONAL MEDICINE (OUTPATIENT)
Dept: URGENT CARE | Facility: CLINIC | Age: 25
End: 2020-02-05
Payer: COMMERCIAL

## 2020-02-05 ENCOUNTER — APPOINTMENT (OUTPATIENT)
Dept: RADIOLOGY | Facility: IMAGING CENTER | Age: 25
End: 2020-02-05
Attending: PHYSICIAN ASSISTANT
Payer: COMMERCIAL

## 2020-02-05 VITALS
BODY MASS INDEX: 33.86 KG/M2 | DIASTOLIC BLOOD PRESSURE: 82 MMHG | TEMPERATURE: 98.6 F | WEIGHT: 184 LBS | HEART RATE: 55 BPM | SYSTOLIC BLOOD PRESSURE: 122 MMHG | OXYGEN SATURATION: 98 % | HEIGHT: 62 IN | RESPIRATION RATE: 16 BRPM

## 2020-02-05 DIAGNOSIS — S63.92XA HAND SPRAIN, LEFT, INITIAL ENCOUNTER: ICD-10-CM

## 2020-02-05 DIAGNOSIS — M79.642 LEFT HAND PAIN: ICD-10-CM

## 2020-02-05 DIAGNOSIS — F33.1 MDD (MAJOR DEPRESSIVE DISORDER), RECURRENT EPISODE, MODERATE (HCC): ICD-10-CM

## 2020-02-05 LAB
O+P STL MICRO: NEGATIVE
O+P STL TRI STN: NEGATIVE

## 2020-02-05 PROCEDURE — 73130 X-RAY EXAM OF HAND: CPT | Mod: TC,LT | Performed by: PHYSICIAN ASSISTANT

## 2020-02-05 PROCEDURE — 99203 OFFICE O/P NEW LOW 30 MIN: CPT | Performed by: PHYSICIAN ASSISTANT

## 2020-02-05 PROCEDURE — 90791 PSYCH DIAGNOSTIC EVALUATION: CPT | Performed by: MARRIAGE & FAMILY THERAPIST

## 2020-02-05 ASSESSMENT — ENCOUNTER SYMPTOMS
TINGLING: 1
CHILLS: 0
FEVER: 0
SENSORY CHANGE: 1
FOCAL WEAKNESS: 0
WEAKNESS: 0
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
## 2020-02-05 NOTE — PROGRESS NOTES
"Subjective:      Zaynab Galarza is a 24 y.o. female who presents with Hand Injury (NEW WC DOI-2/5/20 (L))            Hand Injury         ROS       Objective:     /82 (BP Location: Right arm)   Pulse (!) 55   Temp 37 °C (98.6 °F) (Temporal)   Resp 16   Ht 1.575 m (5' 2\")   Wt 83.5 kg (184 lb)   LMP 02/03/2020 (Exact Date)   SpO2 98%   BMI 33.65 kg/m²      Physical Exam            Assessment/Plan:       There are no diagnoses linked to this encounter.    "

## 2020-02-06 NOTE — PROGRESS NOTES
"Subjective:      Zaynab Galarza is a 24 y.o. female who presents with Hand Injury (NEW  DOI-2/5/20 (L))    HPI  DOI: 02/05/20   . No other secondary employment.     Today, two 2 year old children holding patient's left hand, one child holding her 5th finger, and the other holding fingers 2-4. They decided to pull patient's fingers apart.  She developed mild to severe pain shortly after, mild swelling.  Associated tingling to her third and fourth fingers.  No weakness.  Currently she has mild pain to her left hand exacerbated with gripping and movement.        Review of Systems   Constitutional: Negative for chills and fever.   Musculoskeletal:        Left hand pain   Neurological: Positive for tingling and sensory change. Negative for focal weakness and weakness.          Objective:     /82 (BP Location: Right arm)   Pulse (!) 55   Temp 37 °C (98.6 °F) (Temporal)   Resp 16   Ht 1.575 m (5' 2\")   Wt 83.5 kg (184 lb)   LMP 02/03/2020 (Exact Date)   SpO2 98%   BMI 33.65 kg/m²      Physical Exam  Vitals signs reviewed.   Constitutional:       Appearance: Normal appearance.   Eyes:      Conjunctiva/sclera: Conjunctivae normal.   Cardiovascular:      Rate and Rhythm: Normal rate and regular rhythm.      Heart sounds: Normal heart sounds.   Pulmonary:      Effort: Pulmonary effort is normal. No respiratory distress.      Breath sounds: Normal breath sounds. No wheezing, rhonchi or rales.   Neurological:      Mental Status: She is alert.       Left hand: Tenderness to palpation over fourth and fifth metacarpals.  Mild swelling.  No ecchymosis.  Full flexion and extension of fingers.   strength 5 out of 5.  Tendons intact.   Sensation intact.  Cap refill less than 2 seconds.  Radial pulses intact.  No tenderness to left wrist full range of motion to wrist    Past Medical History:   Diagnosis Date   • Asthma       Past Surgical History:   Procedure Laterality Date   • DENTAL " EXTRACTION(S)      [address]   • TONSILLECTOMY        Social History     Socioeconomic History   • Marital status: Single     Spouse name: Not on file   • Number of children: Not on file   • Years of education: Not on file   • Highest education level: Not on file   Occupational History   • Not on file   Social Needs   • Financial resource strain: Not on file   • Food insecurity:     Worry: Not on file     Inability: Not on file   • Transportation needs:     Medical: Not on file     Non-medical: Not on file   Tobacco Use   • Smoking status: Never Smoker   • Smokeless tobacco: Never Used   Substance and Sexual Activity   • Alcohol use: Yes     Frequency: 2-4 times a month     Drinks per session: 1 or 2   • Drug use: Yes     Types: Marijuana     Comment: cbc oil/ thc pen on occasions   • Sexual activity: Yes     Partners: Male     Birth control/protection: Pill   Lifestyle   • Physical activity:     Days per week: Not on file     Minutes per session: Not on file   • Stress: Not on file   Relationships   • Social connections:     Talks on phone: Not on file     Gets together: Not on file     Attends Christianity service: Not on file     Active member of club or organization: Not on file     Attends meetings of clubs or organizations: Not on file     Relationship status: Not on file   • Intimate partner violence:     Fear of current or ex partner: Not on file     Emotionally abused: Not on file     Physically abused: Not on file     Forced sexual activity: Not on file   Other Topics Concern   • Not on file   Social History Narrative   • Not on file    Penicillins and Zithromax [azithromycin]    DX: Left Hand   FINDINGS:  No acute fracture or subluxation is seen. There is slight widening of the fourth interphalangeal space     Preserved joint spaces. No bony spurring.     IMPRESSION:     No radiographic evidence of acute displaced fracture     Mild ulnar deviation at the fifth MCP joint could be positional or related to soft  tissue injury. MRI could further characterize if clinically indicated.    Assessment/Plan:     1. Hand sprain, left, initial encounter    - DX-HAND 3+ LEFT; Above    Rest, Ice, elevation, Ibuprofen (600-800mg) and Tylenol. Range of motion exercises.   Chepe taped fourth and fifth fingers.  Rest for 1 day then range of motion exercises.  Work restrictions.   Follow up in 7 days for re-evaluation.     Return sooner for any worsening symptoms such as pain, numbness, tingling, weakness, or any other concerns.  Patient understood and agreed to plan of care.    Supportive care, differential diagnoses, and indications for immediate follow-up discussed with patient.    Pathogenesis of diagnosis discussed including typical length and natural progression. Patient expresses understanding and agrees to plan.      Please note that this dictation was created using voice recognition software. I have made every reasonable attempt to correct obvious errors, but I expect that there are errors of grammar and possibly content that I did not discover before finalizing the note.

## 2020-02-11 ENCOUNTER — OCCUPATIONAL MEDICINE (OUTPATIENT)
Dept: OCCUPATIONAL MEDICINE | Facility: CLINIC | Age: 25
End: 2020-02-11
Payer: COMMERCIAL

## 2020-02-11 VITALS
OXYGEN SATURATION: 98 % | BODY MASS INDEX: 33.86 KG/M2 | WEIGHT: 184 LBS | HEIGHT: 62 IN | DIASTOLIC BLOOD PRESSURE: 74 MMHG | HEART RATE: 75 BPM | RESPIRATION RATE: 14 BRPM | SYSTOLIC BLOOD PRESSURE: 122 MMHG | TEMPERATURE: 97.8 F

## 2020-02-11 DIAGNOSIS — S63.92XD HAND SPRAIN, LEFT, SUBSEQUENT ENCOUNTER: ICD-10-CM

## 2020-02-11 DIAGNOSIS — R20.2 NUMBNESS AND TINGLING IN LEFT HAND: ICD-10-CM

## 2020-02-11 DIAGNOSIS — R20.0 NUMBNESS AND TINGLING IN LEFT HAND: ICD-10-CM

## 2020-02-11 PROCEDURE — 99213 OFFICE O/P EST LOW 20 MIN: CPT | Performed by: NURSE PRACTITIONER

## 2020-02-11 ASSESSMENT — ENCOUNTER SYMPTOMS
TINGLING: 1
MYALGIAS: 1
CARDIOVASCULAR NEGATIVE: 1
CONSTITUTIONAL NEGATIVE: 1
RESPIRATORY NEGATIVE: 1
SENSORY CHANGE: 1
WEAKNESS: 0
PSYCHIATRIC NEGATIVE: 1

## 2020-02-11 ASSESSMENT — PAIN SCALES - GENERAL: PAINLEVEL: 2=MINIMAL-SLIGHT

## 2020-02-11 NOTE — PROGRESS NOTES
Subjective:      Zaynab Galarza is a 24 y.o. female who presents with Follow-Up ((NEW  DOI-2/5/20 (L) Hand - Better - RM 16)      DOI: 02/05/20: . No other secondary employment. Today, two 2 year old children holding patient's left hand, one child holding her 5th finger, and the other holding fingers 2-4. They decided to pull patient's fingers apart.  She developed mild to severe pain shortly after, mild swelling.  Associated tingling to her third and fourth fingers.  No weakness.  Currently she has mild pain to her left hand exacerbated with gripping and movement.     Today overall mild improvement. Pain is described as intermittent, throbbing, sharp, tingling, and numbness to the fourth and fifth metacarpals. Patient denies weakness, swelling, or decreased ROM. Patient has been taking Ibuprofen with moderate relief of symptoms. Patient has been applying ice, which exacerbates her symptoms. Patient states that she was wearing an ace wrap, but this exacerbated her symptoms. Patient is tolerating work restrictions. Plan of care discussed with patient.      HPI    Review of Systems   Constitutional: Negative.    Respiratory: Negative.    Cardiovascular: Negative.    Musculoskeletal: Positive for joint pain and myalgias.   Skin: Negative.    Neurological: Positive for tingling and sensory change. Negative for weakness.   Psychiatric/Behavioral: Negative.         ROS: All systems were reviewed on intake form, form was reviewed and signed. See scanned documents in media. Pertinent positives and negatives included in HPI.    PMH: No pertinent past medical history to this problem  MEDS: Medications were reviewed in Epic  ALLERGIES:   Allergies   Allergen Reactions   • Penicillins    • Zithromax [Azithromycin]      SOCHX: Works as  at [employer]  FH: No pertinent family history to this problem       Objective:     /74   Pulse 75   Temp 36.6 °C (97.8 °F)  "(Temporal)   Resp 14   Ht 1.575 m (5' 2\")   Wt 83.5 kg (184 lb)   LMP 02/03/2020 (Exact Date)   SpO2 98%   BMI 33.65 kg/m²      Physical Exam  Constitutional:       General: She is not in acute distress.     Appearance: Normal appearance. She is not ill-appearing.   Cardiovascular:      Rate and Rhythm: Normal rate and regular rhythm.      Pulses: Normal pulses.   Pulmonary:      Effort: Pulmonary effort is normal.   Musculoskeletal: Normal range of motion.         General: Swelling, tenderness and signs of injury present. No deformity.      Left hand: She exhibits tenderness, bony tenderness and swelling. She exhibits normal range of motion and no deformity. Normal sensation noted. Normal strength noted.        Hands:    Skin:     General: Skin is warm and dry.      Capillary Refill: Capillary refill takes less than 2 seconds.      Findings: No bruising or erythema.   Neurological:      General: No focal deficit present.      Mental Status: She is alert and oriented to person, place, and time.      Cranial Nerves: No cranial nerve deficit.      Sensory: Sensory deficit present.      Motor: No weakness.      Gait: Gait normal.      Deep Tendon Reflexes: Reflexes normal.   Psychiatric:         Mood and Affect: Mood normal.         Behavior: Behavior normal.         Left hand:   Neg deformities notes   Pos mild TTP over fourth and fifth metacarpals.  Mild swelling.  No ecchymosis.  Full flexion and extension of fingers.   strength 5 out of 5.  Tendons intact.   Sensation intact.  Cap refill less than 2 seconds.  Radial pulses intact.  Neg TTP to left wrist, FROM    X-ray:  IMPRESSION:     No radiographic evidence of acute displaced fracture     Mild ulnar deviation at the fifth MCP joint could be positional or related to soft tissue injury. MRI could further characterize if clinically indicated.       Assessment/Plan:       1. Hand sprain, left, subsequent encounter    - MR-HAND - W/O LEFT; Future  - " REFERRAL TO RADIOLOGY    2. Numbness and tingling in left hand    - MR-HAND - W/O LEFT; Future  - REFERRAL TO RADIOLOGY    Follow-up in 2 weeks   Restricted duty   MRI ordered   Continue with ice as tolerated   Continue with OTC Ibuprofen as needed   Try warm water and Epson salt soaks for symptoms   Continue with gentle range of motion and stretching exercises as tolerated   Wear wrist brace while at work, wean at home

## 2020-02-11 NOTE — LETTER
[address],   [address] [place] [address]  Phone:  [phone] - Fax:  [phone]   Occupational Health [facility] Progress Report and Disability Certification  Date of Service: 2/11/2020   No Show:  No  Date / Time of Next Visit: 2/25/2020 @ 8 AM   Claim Information   Patient Name: Zaynab Galarza  Claim Number:     Employer: [employer]  Date of Injury: 2/5/2020     Insurer / TPA: Employers Insurance  ID / SSN:     Occupation: Teacher  Diagnosis: The encounter diagnosis was Hand sprain, left, subsequent encounter.    Medical Information   Related to Industrial Injury? Yes    Subjective Complaints:  DOI: 02/05/20: . No other secondary employment. Today, two 2 year old children holding patient's left hand, one child holding her 5th finger, and the other holding fingers 2-4. They decided to pull patient's fingers apart.  She developed mild to severe pain shortly after, mild swelling.  Associated tingling to her third and fourth fingers.  No weakness.  Currently she has mild pain to her left hand exacerbated with gripping and movement.     Today overall mild improvement. Pain is described as intermittent, throbbing, sharp, tingling, and numbness to the fourth and fifth metacarpals. Patient denies weakness, swelling, or decreased ROM. Patient has been taking Ibuprofen with moderate relief of symptoms. Patient has been applying ice, which exacerbates her symptoms. Patient states that she was wearing an ace wrap, but this exacerbated her symptoms. Patient is tolerating work restrictions. Plan of care discussed with patient.    Objective Findings: Left hand:   Neg deformities notes   Pos mild TTP over fourth and fifth metacarpals.  Mild swelling.  No ecchymosis.  Full flexion and extension of fingers.   strength 5 out of 5.  Tendons intact.   Sensation intact.  Cap refill less than 2 seconds.  Radial pulses intact.  Neg TTP to  left wrist, FROM    X-ray:  IMPRESSION:     No radiographic evidence of acute displaced fracture     Mild ulnar deviation at the fifth MCP joint could be positional or related to soft tissue injury. MRI could further characterize if clinically indicated.   Pre-Existing Condition(s): None    Assessment:   Condition Improved    Status: Additional Care Required  Permanent Disability:No    Plan: Diagnostics    Diagnostics: MRI    Comments:  Follow-up in 2 weeks  Restricted duty  MRI ordered   Continue with ice as tolerated  Continue with OTC Ibuprofen as needed  Try warm water and Epson salt soaks for symptoms  Continue with gentle range of motion and stretching exercises as tolerated    Wear wrist brace while at work, wean at home      Disability Information   Status: Released to Restricted Duty    From:  2/11/2020  Through: 2/25/2020 Restrictions are: Temporary   Physical Restrictions   Sitting:    Standing:    Stooping:    Bending:      Squatting:    Walking:    Climbing:    Pushing:      Pulling:    Other:    Reaching Above Shoulder (L):   Reaching Above Shoulder (R):       Reaching Below Shoulder (L):    Reaching Below Shoulder (R):      Not to exceed Weight Limits   Carrying(hrs):   Weight Limit(lb): < or = to 10 pounds  Comments:Left hand only Lifting(hrs):   Weight  Limit(lb): < or = to 10 pounds  Comments:Left hand only   Comments:      Repetitive Actions   Hands: i.e. Fine Manipulations from Grasping: < or = to 2 hrs/day  Comments:Left hand only   Feet: i.e. Operating Foot Controls:     Driving / Operate Machinery:     Physician Name: JARVIS Dior Physician Signature:   e-Signature: Dr. Teodoro Skinner, Medical Director   Clinic Name / Location: [address],   [address] [address] Clinic Phone Number: Dept: [phone]   Appointment Time: 8:00 Am Visit Start Time: 7:52 AM   Check-In Time:  7:51 Am Visit Discharge Time: 8:30 AM    Original-Treating  Physician or Chiropractor    Page 2-Insurer/TPA    Page 3-Employer    Page 4-Employee

## 2020-02-12 ENCOUNTER — HOSPITAL ENCOUNTER (OUTPATIENT)
Dept: RADIOLOGY | Facility: MEDICAL CENTER | Age: 25
End: 2020-02-12
Attending: PHYSICIAN ASSISTANT
Payer: COMMERCIAL

## 2020-02-12 ENCOUNTER — OCCUPATIONAL MEDICINE (OUTPATIENT)
Dept: URGENT CARE | Facility: PHYSICIAN GROUP | Age: 25
End: 2020-02-12
Payer: COMMERCIAL

## 2020-02-12 VITALS
DIASTOLIC BLOOD PRESSURE: 70 MMHG | HEART RATE: 76 BPM | SYSTOLIC BLOOD PRESSURE: 120 MMHG | TEMPERATURE: 98.5 F | OXYGEN SATURATION: 99 % | BODY MASS INDEX: 33.86 KG/M2 | WEIGHT: 184 LBS | HEIGHT: 62 IN | RESPIRATION RATE: 16 BRPM

## 2020-02-12 DIAGNOSIS — S66.912D STRAIN OF LEFT HAND, SUBSEQUENT ENCOUNTER: ICD-10-CM

## 2020-02-12 DIAGNOSIS — S69.92XA INJURY OF LEFT HAND, INITIAL ENCOUNTER: ICD-10-CM

## 2020-02-12 PROCEDURE — 73130 X-RAY EXAM OF HAND: CPT | Mod: LT

## 2020-02-12 PROCEDURE — 99213 OFFICE O/P EST LOW 20 MIN: CPT | Performed by: PHYSICIAN ASSISTANT

## 2020-02-12 ASSESSMENT — ENCOUNTER SYMPTOMS
FEVER: 0
TINGLING: 0
SENSORY CHANGE: 1
MYALGIAS: 1
CHILLS: 0
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
## 2020-02-13 ENCOUNTER — OFFICE VISIT (OUTPATIENT)
Dept: BEHAVIORAL HEALTH | Facility: CLINIC | Age: 25
End: 2020-02-13
Payer: COMMERCIAL

## 2020-02-13 DIAGNOSIS — F33.1 MDD (MAJOR DEPRESSIVE DISORDER), RECURRENT EPISODE, MODERATE (HCC): ICD-10-CM

## 2020-02-13 PROCEDURE — 90834 PSYTX W PT 45 MINUTES: CPT | Performed by: MARRIAGE & FAMILY THERAPIST

## 2020-02-13 NOTE — BH THERAPY
" [facility]  Therapy Progress Note    Patient Name: Zaynab Galarza  Patient MRN: [id number]  Today's Date: 2/13/2020     Type of session:Individual psychotherapy  Length of session: 45 minutes  Persons in attendance:Patient    Subjective/New Info: Patient injured her left hand twice in the last week. The injury is causing pain on her left side and limiting her at work. She has also been having intermittent GI problems that include pain in her abdomen and vomiting. Suggested she keep a food diary and see if she can identify any foods that correlate with the symptoms. She says she feels like her boyfriend's \"second girlfriend\" and that his video games are his first. He plays them for many hours every night. He is also very particular about housekeeping and holds her more responsible for housekeeping than he is. He is also negative and angry when they do housework together. Went over sleep hygiene guidelines as she hasn't been sleeping well. Encouraged her to get outside and walk a little on her break at work.    Objective/Observations:   Participation: Active verbal participation, Attentive and Engaged   Grooming: Casual   Cognition: Alert and Fully Oriented   Eye contact: Good   Mood: Euthymic   Affect: Flexible and Congruent with content   Thought process: Logical   Speech: Rate within normal limits and Volume within normal limits   Other:     Diagnoses:   1. MDD (major depressive disorder), recurrent episode, moderate (HCC)         Current risk:   SUICIDE: Low   Homicide: Not applicable   Self-harm: Not applicable   Relapse: Not applicable   Other:    Safety Plan reviewed? Not Indicated   If evidence of imminent risk is present, intervention/plan:     Therapeutic Intervention(s): Cognitive modification, Establish rapport and Interpersonal effectiveness skills  Treatment Goal(s)/Objective(s) addressed: Depression     Goal: Improve overall mood   Call crisis hotline if having suicidal thoughts or " implement suicide plan as necessary.   Get 7-8 hours of restful sleep every night using sleep hygiene techniques.  Develop and implement plan of increasing pleasant exercise to 3-4 sessions per week.  Be outside in sunlight for at least 15 minutes per day, or use light box therapy to replace/supplement sunlight.  Identify dietary changes that would support overall goals and reduce mood and energy swings.  Avoid napping/sleeping to escape other people and activities.   Shower, dress, and plan to do something every day.  Increase positive social contacts and make plans with friends 1-3x per week.  Develop leisure activities and hobbies and set aside time each week to devote for these.  Make short and simple “to do” lists and complete three tasks each day.   Journal to measure success and progress, and list 3 daily gratitude thoughts.  Develop strategies for thought distraction when ruminating on the past regrets.  Identify cognitive distortions that serve to maintain depressed thinking.  Identify core beliefs about self that are shaped by cognitive distortions.  Develop a positive self concept based on actual strengths and assets.  Identify cognitive distortions and associated automatic thoughts and generate rational responses. Repeat!  Address life situations that contribute to hopeless and helpless feelings. Manage changes to increase autonomy and optimism.     Progress toward Treatment Goals: No change    Plan:  - Next appointment scheduled:  2/21/2020  - Patient is in agreement with the above plan:  YES    NINI Soria  2/13/2020
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
## 2020-02-21 ENCOUNTER — OFFICE VISIT (OUTPATIENT)
Dept: BEHAVIORAL HEALTH | Facility: CLINIC | Age: 25
End: 2020-02-21
Payer: COMMERCIAL

## 2020-02-21 DIAGNOSIS — F33.1 MDD (MAJOR DEPRESSIVE DISORDER), RECURRENT EPISODE, MODERATE (HCC): ICD-10-CM

## 2020-02-21 PROCEDURE — 90834 PSYTX W PT 45 MINUTES: CPT | Performed by: MARRIAGE & FAMILY THERAPIST
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
## 2020-02-25 ENCOUNTER — OCCUPATIONAL MEDICINE (OUTPATIENT)
Dept: OCCUPATIONAL MEDICINE | Facility: CLINIC | Age: 25
End: 2020-02-25
Payer: COMMERCIAL

## 2020-02-25 VITALS
TEMPERATURE: 98.5 F | HEART RATE: 55 BPM | HEIGHT: 62 IN | WEIGHT: 179 LBS | OXYGEN SATURATION: 97 % | BODY MASS INDEX: 32.94 KG/M2 | DIASTOLIC BLOOD PRESSURE: 68 MMHG | SYSTOLIC BLOOD PRESSURE: 112 MMHG

## 2020-02-25 DIAGNOSIS — R20.0 NUMBNESS AND TINGLING IN LEFT HAND: ICD-10-CM

## 2020-02-25 DIAGNOSIS — R20.2 NUMBNESS AND TINGLING IN LEFT HAND: ICD-10-CM

## 2020-02-25 DIAGNOSIS — S63.92XD HAND SPRAIN, LEFT, SUBSEQUENT ENCOUNTER: ICD-10-CM

## 2020-02-25 PROCEDURE — 99213 OFFICE O/P EST LOW 20 MIN: CPT | Performed by: NURSE PRACTITIONER

## 2020-02-25 ASSESSMENT — ENCOUNTER SYMPTOMS
PSYCHIATRIC NEGATIVE: 1
SENSORY CHANGE: 1
CONSTITUTIONAL NEGATIVE: 1
RESPIRATORY NEGATIVE: 1
WEAKNESS: 1
TINGLING: 0
CARDIOVASCULAR NEGATIVE: 1
MYALGIAS: 1
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
## 2020-02-25 NOTE — PROGRESS NOTES
"Subjective:      Zaynab Galarza is a 24 y.o. female who presents with Other (WC DOI -2/5/20 (L) Hand - Better - RM 18)      DOI: 02/05/20: . No other secondary employment. Today, two 2 year old children holding patient's left hand, one child holding her 5th finger, and the other holding fingers 2-4. They decided to pull patient's fingers apart.  She developed mild to severe pain shortly after, mild swelling.  Associated tingling to her third and fourth fingers.  No weakness.  Currently she has mild pain to her left hand exacerbated with gripping and movement.      Today reports no improvement. Patient was seen 2/12/20 because she fell on an outstretched left hand at work re injuring her left hand. She tripped over a small student who was riding a bike on the side walk.   Pain is described as intermittent, throbbing, sharp, tingling, and numbness to the fourth and fifth metacarpals. Patient denies weakness, swelling, or decreased ROM. Patient has been taking Ibuprofen with moderate relief of symptoms. Patient has been applying ice, which exacerbates her symptoms. Patient states that she was wearing an wrist brace, with moderate symptom releif. Patient is tolerating work restrictions. Plan of care discussed with patient.       HPI    Review of Systems   Constitutional: Negative.    Respiratory: Negative.    Cardiovascular: Negative.    Musculoskeletal: Positive for joint pain and myalgias.   Skin: Negative.    Neurological: Positive for sensory change and weakness. Negative for tingling.   Psychiatric/Behavioral: Negative.        SOCHX: Works as  at [employer]  FH: No pertinent family history to this problem     Objective:     /68   Pulse (!) 55   Temp 36.9 °C (98.5 °F)   Ht 1.575 m (5' 2\")   Wt 81.2 kg (179 lb)   LMP 02/03/2020 (Exact Date)   SpO2 97%   BMI 32.74 kg/m²      Physical Exam  Constitutional:       General: She is not in acute distress.     " Appearance: Normal appearance. She is not ill-appearing.   Cardiovascular:      Rate and Rhythm: Normal rate and regular rhythm.   Pulmonary:      Effort: Pulmonary effort is normal.   Musculoskeletal:         General: Tenderness and signs of injury present. No swelling or deformity.   Skin:     General: Skin is warm and dry.      Capillary Refill: Capillary refill takes less than 2 seconds.      Findings: No bruising or erythema.   Neurological:      General: No focal deficit present.      Mental Status: She is alert and oriented to person, place, and time.      Cranial Nerves: No cranial nerve deficit.      Sensory: Sensory deficit present.      Motor: No weakness.      Coordination: Coordination normal.      Gait: Gait normal.      Deep Tendon Reflexes: Reflexes normal.   Psychiatric:         Mood and Affect: Mood normal.         Behavior: Behavior normal.         Left Hand: Improved, limited flexion of 4th and 5th digit due to pain. Moderate TTP over 4th and 5th metacarpals. Skin without edema, ecchymosis, or erythema. Distal n/v intact in all digits.  2/12/2020 1:43 PM     HISTORY/REASON FOR EXAM:  Pain in left hand after ground-level fall        TECHNIQUE/EXAM DESCRIPTION AND NUMBER OF VIEWS:  3 views of the LEFT hand.     COMPARISON:  02/05/2020     FINDINGS:  Bone mineralization is normal.  There is no evidence of fracture or dislocation.  Soft tissues are normal.     IMPRESSION:     No evidence of fracture or dislocation.      MRI 2/22/20: Impression   Unremarkable left hand MRI exam without evidence of internal derangement in the left hand         Assessment/Plan:       1. Hand sprain, left, subsequent encounter    - REFERRAL TO HAND SURGERY  - REFERRAL TO PHYSICAL THERAPY Reason for Therapy: Eval/Treat/Report    2. Numbness and tingling in left hand    - REFERRAL TO HAND SURGERY  - REFERRAL TO PHYSICAL THERAPY Reason for Therapy: Eval/Treat/Report      Follow-up in 3 weeks, if not seen by hand  surgery  Transfer care, hand surgery  Hand therapy referral placed   Hand Surgery referral placed  Restricted duty   Continue with ice as tolerated   Continue with OTC Ibuprofen as needed   Try warm water and Epson salt soaks for symptoms   Continue with gentle range of motion and stretching exercises as tolerated   Wear wrist brace while at work, wean at home

## 2020-03-03 ENCOUNTER — OFFICE VISIT (OUTPATIENT)
Dept: BEHAVIORAL HEALTH | Facility: CLINIC | Age: 25
End: 2020-03-03
Payer: COMMERCIAL

## 2020-03-03 VITALS
SYSTOLIC BLOOD PRESSURE: 130 MMHG | WEIGHT: 183 LBS | DIASTOLIC BLOOD PRESSURE: 87 MMHG | BODY MASS INDEX: 33.68 KG/M2 | HEIGHT: 62 IN | RESPIRATION RATE: 14 BRPM | HEART RATE: 62 BPM

## 2020-03-03 DIAGNOSIS — F43.10 PTSD (POST-TRAUMATIC STRESS DISORDER): ICD-10-CM

## 2020-03-03 DIAGNOSIS — F33.1 MDD (MAJOR DEPRESSIVE DISORDER), RECURRENT EPISODE, MODERATE (HCC): ICD-10-CM

## 2020-03-03 DIAGNOSIS — F41.1 GAD (GENERALIZED ANXIETY DISORDER): ICD-10-CM

## 2020-03-03 PROCEDURE — 99213 OFFICE O/P EST LOW 20 MIN: CPT | Performed by: NURSE PRACTITIONER

## 2020-03-03 RX ORDER — FLUOXETINE 20 MG/1
20 TABLET, FILM COATED ORAL DAILY
Qty: 90 TAB | Refills: 0 | Status: SHIPPED | OUTPATIENT
Start: 2020-03-03 | End: 2021-08-31

## 2020-03-03 ASSESSMENT — FIBROSIS 4 INDEX: FIB4 SCORE: 0.52

## 2020-03-13 ENCOUNTER — OFFICE VISIT (OUTPATIENT)
Dept: URGENT CARE | Facility: CLINIC | Age: 25
End: 2020-03-13
Payer: COMMERCIAL

## 2020-03-13 VITALS
OXYGEN SATURATION: 95 % | HEIGHT: 62 IN | SYSTOLIC BLOOD PRESSURE: 114 MMHG | BODY MASS INDEX: 33.86 KG/M2 | HEART RATE: 98 BPM | TEMPERATURE: 97.7 F | DIASTOLIC BLOOD PRESSURE: 70 MMHG | WEIGHT: 184 LBS

## 2020-03-13 DIAGNOSIS — Z87.09 HISTORY OF ASTHMA: ICD-10-CM

## 2020-03-13 DIAGNOSIS — T78.40XA ALLERGIC STATE, INITIAL ENCOUNTER: ICD-10-CM

## 2020-03-13 DIAGNOSIS — J98.8 RESPIRATORY TRACT INFECTION: ICD-10-CM

## 2020-03-13 PROCEDURE — 99214 OFFICE O/P EST MOD 30 MIN: CPT | Performed by: NURSE PRACTITIONER

## 2020-03-13 RX ORDER — ALBUTEROL SULFATE 90 UG/1
1-2 AEROSOL, METERED RESPIRATORY (INHALATION) EVERY 4 HOURS PRN
Qty: 1 INHALER | Refills: 0 | Status: SHIPPED | OUTPATIENT
Start: 2020-03-13 | End: 2021-08-31 | Stop reason: SDUPTHER

## 2020-03-13 RX ORDER — OMEPRAZOLE 20 MG/1
CAPSULE, DELAYED RELEASE ORAL
COMMUNITY
Start: 2020-01-30 | End: 2020-03-13

## 2020-03-13 RX ORDER — CEFDINIR 300 MG/1
300 CAPSULE ORAL 2 TIMES DAILY
Qty: 10 CAP | Refills: 0 | Status: SHIPPED | OUTPATIENT
Start: 2020-03-13 | End: 2020-03-18

## 2020-03-13 ASSESSMENT — ENCOUNTER SYMPTOMS
EYE DISCHARGE: 0
HEADACHES: 1
SPUTUM PRODUCTION: 1
SORE THROAT: 1
NAUSEA: 0
STRIDOR: 0
EYE REDNESS: 0
VOMITING: 0
WHEEZING: 1
CHILLS: 1
SHORTNESS OF BREATH: 1
COUGH: 1
ABDOMINAL PAIN: 0
FEVER: 0
PALPITATIONS: 0
DIARRHEA: 0
MYALGIAS: 0

## 2020-03-13 ASSESSMENT — FIBROSIS 4 INDEX: FIB4 SCORE: 0.52

## 2020-03-13 NOTE — LETTER
March 13, 2020         Patient: Zaynab Galarza   YOB: 1995   Date of Visit: 3/13/2020           To Whom it May Concern:    Zaynab Galarza was seen in my clinic on 3/13/2020. Please excuse her from work 3/12/2020 through 3/13/2020.    If you have any questions or concerns, please don't hesitate to call.        Sincerely,           CAN Garay.  Electronically Signed

## 2020-03-13 NOTE — PROGRESS NOTES
Subjective:   Zaynab Galarza is a 24 y.o. female who presents for Cough (w/ both dry and phlegm, chills, SOB, no fever, sore throat from coughing, x 2 days)        Cough   This is a new problem. The current episode started more than 1 month ago (Worsening over the past 2-3 days. States she has mold in her apartment and is concerned for this causing her chronic cough. ). The problem occurs every few minutes. The cough is productive of sputum. Associated symptoms include chills, headaches, nasal congestion, a sore throat, shortness of breath and wheezing. Pertinent negatives include no chest pain, ear pain, eye redness, fever, myalgias or postnasal drip. The symptoms are aggravated by lying down. She has tried OTC cough suppressant for the symptoms. Her past medical history is significant for asthma and environmental allergies.        Review of Systems   Constitutional: Positive for chills. Negative for fever.   HENT: Positive for congestion and sore throat. Negative for ear discharge, ear pain and postnasal drip.    Eyes: Negative for discharge and redness.   Respiratory: Positive for cough, sputum production, shortness of breath and wheezing. Negative for stridor.    Cardiovascular: Negative for chest pain and palpitations.   Gastrointestinal: Negative for abdominal pain, diarrhea, nausea and vomiting.   Musculoskeletal: Negative for myalgias.   Neurological: Positive for headaches.   Endo/Heme/Allergies: Positive for environmental allergies.   All other systems reviewed and are negative.    PMH:  has a past medical history of Asthma. She also has no past medical history of Diabetes (HCC), Hyperlipidemia, or Hypertension.  ALLERGIES:   Allergies   Allergen Reactions   • Penicillins    • Zithromax [Azithromycin]        Patient's PMH, SocHx, SurgHx, FamHx, Drug allergies and medications reviewed.     Objective:   /70 (BP Location: Left arm, Patient Position: Sitting, BP Cuff Size: Adult)   Pulse 98    "Temp 36.5 °C (97.7 °F) (Tympanic)   Ht 1.575 m (5' 2\")   Wt 83.5 kg (184 lb)   LMP 02/23/2020   SpO2 95%   BMI 33.65 kg/m²   Physical Exam  Vitals signs reviewed.   Constitutional:       Appearance: She is well-developed.   HENT:      Head: Normocephalic.      Right Ear: Hearing and ear canal normal. A middle ear effusion is present. Tympanic membrane is not perforated or erythematous.      Left Ear: Hearing and ear canal normal. A middle ear effusion is present. Tympanic membrane is bulging. Tympanic membrane is not perforated or erythematous.      Nose: Mucosal edema and congestion present. No rhinorrhea.      Right Turbinates: Enlarged.      Left Turbinates: Enlarged.      Right Sinus: No maxillary sinus tenderness or frontal sinus tenderness.      Left Sinus: No maxillary sinus tenderness or frontal sinus tenderness.      Mouth/Throat:      Lips: Pink.      Mouth: Mucous membranes are moist.      Pharynx: Uvula midline. Oropharyngeal exudate present. No posterior oropharyngeal erythema or uvula swelling.      Tonsils: No tonsillar exudate.   Eyes:      General: Lids are normal.      Extraocular Movements: Extraocular movements intact.      Conjunctiva/sclera: Conjunctivae normal.      Pupils: Pupils are equal, round, and reactive to light.   Neck:      Musculoskeletal: Normal range of motion.      Thyroid: No thyromegaly.   Cardiovascular:      Rate and Rhythm: Normal rate and regular rhythm.      Heart sounds: Normal heart sounds.   Pulmonary:      Effort: Pulmonary effort is normal. No tachypnea, bradypnea, accessory muscle usage, prolonged expiration or respiratory distress.      Breath sounds: Examination of the right-upper field reveals wheezing. Examination of the left-upper field reveals wheezing. Wheezing present.      Comments: Mild wheezing noted  Lymphadenopathy:      Head:      Right side of head: No submandibular or tonsillar adenopathy.      Left side of head: Submandibular adenopathy present. " No tonsillar adenopathy.   Skin:     General: Skin is warm and dry.      Findings: No rash.   Neurological:      Mental Status: She is alert and oriented to person, place, and time.   Psychiatric:         Mood and Affect: Mood normal.         Speech: Speech normal.         Behavior: Behavior normal. Behavior is cooperative.         Thought Content: Thought content normal.         Judgment: Judgment normal.           Assessment/Plan:   Assessment    1. Respiratory tract infection  cefdinir (OMNICEF) 300 MG Cap    albuterol 108 (90 Base) MCG/ACT Aero Soln inhalation aerosol   2. Allergic state, initial encounter  REFERRAL TO ENT   3. History of asthma       Discussed to use albuterol inhaler as needed. Patient concerned for mold inside apartment and chronic allergies. I advised to follow up with PCP and ENT in regards to this. Unable to determine at this visit due to patient having bacterial resp tract infection currently.  Patient encouraged to increase clear liquid intake    Differential diagnosis, natural history, supportive care, and indications for immediate follow-up discussed.     **Please note that all invasive procedures during this visit were performed by myself and/or the Medical Assistant under the supervision of the PA or MD in office**

## 2020-03-17 ENCOUNTER — APPOINTMENT (OUTPATIENT)
Dept: BEHAVIORAL HEALTH | Facility: CLINIC | Age: 25
End: 2020-03-17
Payer: COMMERCIAL

## 2020-04-03 ENCOUNTER — APPOINTMENT (OUTPATIENT)
Dept: BEHAVIORAL HEALTH | Facility: CLINIC | Age: 25
End: 2020-04-03
Payer: COMMERCIAL

## 2020-04-17 ENCOUNTER — OFFICE VISIT (OUTPATIENT)
Dept: URGENT CARE | Facility: CLINIC | Age: 25
End: 2020-04-17
Payer: COMMERCIAL

## 2020-04-17 ENCOUNTER — APPOINTMENT (OUTPATIENT)
Dept: RADIOLOGY | Facility: IMAGING CENTER | Age: 25
End: 2020-04-17
Attending: FAMILY MEDICINE
Payer: COMMERCIAL

## 2020-04-17 VITALS
DIASTOLIC BLOOD PRESSURE: 72 MMHG | WEIGHT: 184 LBS | RESPIRATION RATE: 12 BRPM | BODY MASS INDEX: 28.88 KG/M2 | HEART RATE: 90 BPM | OXYGEN SATURATION: 95 % | HEIGHT: 67 IN | SYSTOLIC BLOOD PRESSURE: 116 MMHG | TEMPERATURE: 98.1 F

## 2020-04-17 DIAGNOSIS — J18.9 PNEUMONIA OF LEFT LOWER LOBE DUE TO INFECTIOUS ORGANISM: ICD-10-CM

## 2020-04-17 DIAGNOSIS — R05.9 COUGH: ICD-10-CM

## 2020-04-17 DIAGNOSIS — J45.901 EXACERBATION OF ASTHMA, UNSPECIFIED ASTHMA SEVERITY, UNSPECIFIED WHETHER PERSISTENT: ICD-10-CM

## 2020-04-17 PROCEDURE — 71046 X-RAY EXAM CHEST 2 VIEWS: CPT | Mod: TC | Performed by: FAMILY MEDICINE

## 2020-04-17 PROCEDURE — 99214 OFFICE O/P EST MOD 30 MIN: CPT | Performed by: FAMILY MEDICINE

## 2020-04-17 RX ORDER — ALBUTEROL SULFATE 2.5 MG/3ML
2.5 SOLUTION RESPIRATORY (INHALATION) EVERY 4 HOURS PRN
Qty: 30 BULLET | Refills: 2 | Status: SHIPPED | OUTPATIENT
Start: 2020-04-17 | End: 2021-08-31 | Stop reason: SDUPTHER

## 2020-04-17 RX ORDER — DOXYCYCLINE HYCLATE 100 MG
100 TABLET ORAL 2 TIMES DAILY
Qty: 14 TAB | Refills: 0 | Status: CANCELLED | OUTPATIENT
Start: 2020-04-17 | End: 2020-04-24

## 2020-04-17 RX ORDER — BENZONATATE 200 MG/1
CAPSULE ORAL
Qty: 30 CAP | Refills: 0 | Status: CANCELLED | OUTPATIENT
Start: 2020-04-17

## 2020-04-17 RX ORDER — LEVOFLOXACIN 750 MG/1
750 TABLET, FILM COATED ORAL DAILY
Qty: 10 TAB | Refills: 0 | Status: SHIPPED | OUTPATIENT
Start: 2020-04-17 | End: 2020-04-27

## 2020-04-17 RX ORDER — METHYLPREDNISOLONE 4 MG/1
TABLET ORAL
Qty: 21 TAB | Refills: 0 | Status: SHIPPED | OUTPATIENT
Start: 2020-04-17 | End: 2021-08-31

## 2020-04-17 ASSESSMENT — FIBROSIS 4 INDEX: FIB4 SCORE: 0.52

## 2020-04-17 NOTE — PROGRESS NOTES
Chief Complaint:    Chief Complaint   Patient presents with   • Cough     sore throat, sob , headache  right ear pain , side head hurts       History of Present Illness:    Patient has chills, sweating, right ear ache, stuffy nose, sore throat, cough productive of purulent mucus, shortness of breath, wheezing, tightness in chest, and burning in chest. She was seen here on 3/13/2020 and rx'd Cefdinir 300 mg BID x 5 days. She reports she has had hives with Cephalosporin, although was not on allergy list today or at previous visit. I will add Cephalosporin to allergy list. However, she did OK with Cefdinir. She reports some improvement with Cefdinir, but never got fully better. She has Asthma and reports steroids have been helpful in the past. She has Albuterol MDI to use and has nebulizer machine, but she needs more neb solution.      Review of Systems:    Constitutional: See HPI.  Eyes: Negative for change in vision, photophobia, pain, redness, and discharge.  ENT: See HPI.  Respiratory: See HPI.  Cardiovascular: Negative for chest pain, palpitations, orthopnea, claudication, leg swelling, and PND.   Gastrointestinal: Negative for abdominal pain, nausea, vomiting, diarrhea, constipation, blood in stool, and melena.   Genitourinary: Negative for dysuria, urinary urgency, urinary frequency, hematuria, and flank pain.   Musculoskeletal: Negative for myalgias, joint pain, neck pain, and back pain.   Skin: Negative for rash and itching.   Neurological: Negative for dizziness, tingling, tremors, sensory change, speech change, focal weakness, seizures, loss of consciousness, and headaches.   Endo: Negative for polydipsia.   Heme: Does not bruise/bleed easily.   Psychiatric/Behavioral: Negative for depression, suicidal ideas, hallucinations, memory loss and substance abuse. The patient is not nervous/anxious and does not have insomnia.        Past Medical History:    Past Medical History:   Diagnosis Date   • Asthma       Past Surgical History:    Past Surgical History:   Procedure Laterality Date   • DENTAL EXTRACTION(S)      [place]   • TONSILLECTOMY       Social History:    Social History     Socioeconomic History   • Marital status: Single     Spouse name: Not on file   • Number of children: Not on file   • Years of education: Not on file   • Highest education level: Not on file   Occupational History   • Not on file   Social Needs   • Financial resource strain: Not on file   • Food insecurity     Worry: Not on file     Inability: Not on file   • Transportation needs     Medical: Not on file     Non-medical: Not on file   Tobacco Use   • Smoking status: Never Smoker   • Smokeless tobacco: Never Used   Substance and Sexual Activity   • Alcohol use: Yes     Frequency: 2-4 times a month     Drinks per session: 1 or 2   • Drug use: Yes     Types: Marijuana     Comment: cbc oil/ thc pen on occasions   • Sexual activity: Yes     Partners: Male     Birth control/protection: Pill   Lifestyle   • Physical activity     Days per week: Not on file     Minutes per session: Not on file   • Stress: Not on file   Relationships   • Social connections     Talks on phone: Not on file     Gets together: Not on file     Attends [employer] service: Not on file     Active member of club or organization: Not on file     Attends meetings of clubs or organizations: Not on file     Relationship status: Not on file   • Intimate partner violence     Fear of current or ex partner: Not on file     Emotionally abused: Not on file     Physically abused: Not on file     Forced sexual activity: Not on file   Other Topics Concern   • Not on file   Social History Narrative   • Not on file     Family History:    Family History   Problem Relation Age of Onset   • No Known Problems Mother    • Diabetes Father    • Bipolar disorder Sister    • No Known Problems Brother    • Diabetes Maternal Aunt         ovarian   • Diabetes Paternal Grandmother         breast  "    Medications:    Current Outpatient Medications on File Prior to Visit   Medication Sig Dispense Refill   • albuterol 108 (90 Base) MCG/ACT Aero Soln inhalation aerosol Inhale 1-2 Puffs by mouth every four hours as needed for Shortness of Breath. 1 Inhaler 0   • fluoxetine (PROZAC) 20 MG tablet Take 1 Tab by mouth every day. 90 Tab 0     No current facility-administered medications on file prior to visit.      Allergies:    Allergies   Allergen Reactions   • Cephalosporins      Hives   • Penicillins    • Zithromax [Azithromycin]        Vitals:    Vitals:    04/17/20 1137   BP: 116/72   Pulse: 90   Resp: 12   Temp: 36.7 °C (98.1 °F)   TempSrc: Temporal   SpO2: 95%   Weight: 83.5 kg (184 lb)   Height: 1.702 m (5' 7\")       Physical Exam:    Constitutional: Vital signs reviewed. Appears well-developed and well-nourished. Occl coug. No acute distress.   Eyes: Sclera white, conjunctivae clear.  ENT: External ears normal. External auditory canals normal without discharge. TMs translucent and non-bulging. Hearing normal. Nasal mucosa pink. Lips/teeth are normal. Oral mucosa pink and moist. Posterior pharynx: WNL.  Neck: Neck supple.   Cardiovascular: Regular rate and rhythm. No murmur.  Pulmonary/Chest: Respirations non-labored. Clear to auscultation bilaterally, but she gives poor inspiratory effort.  Lymph: Cervical nodes without tenderness or enlargement.  Musculoskeletal: Normal gait. Normal range of motion. No muscular atrophy or weakness.  Neurological: Alert and oriented to person, place, and time. Muscle tone normal. Coordination normal.   Skin: No rashes or lesions. Warm, dry, normal turgor.  Psychiatric: Normal mood and affect. Behavior is normal. Judgment and thought content normal.       Diagnostics:    DX-CHEST-2 VIEWS   Order: [id number]   Status:  Final result   Visible to patient:  No (Not Released) Next appt:  None Dx:  Cough   Details     Reading Physician Reading Date Result Priority   Dusty ANDREA " ANNETTE Yoo  [phone] 4/17/2020 Urgent Care      Narrative & Impression        4/17/2020 12:01 PM     HISTORY/REASON FOR EXAM:  Cough; Procedure Room 2.        TECHNIQUE/EXAM DESCRIPTION AND NUMBER OF VIEWS:  Two views of the chest.     COMPARISON:  None.     FINDINGS:  The heart is normal in size.  There is left lower lobe consolidation.  No pleural effusions are appreciated.  No pneumothorax.     IMPRESSION:     Left lower lobe consolidation consistent with pneumonia.           I personally reviewed the images. Images and Rad report reviewed with her and copy of report to her.      Assessment / Plan:    1. Cough  - DX-CHEST-2 VIEWS; Future    2. Pneumonia of left lower lobe due to infectious organism  - levoFLOXacin (LEVAQUIN) 750 MG tablet; Take 1 Tab by mouth every day for 10 days.  Dispense: 10 Tab; Refill: 0    3. Exacerbation of asthma, unspecified asthma severity, unspecified whether persistent  - methylPREDNISolone (MEDROL DOSEPAK) 4 MG Tablet Therapy Pack; TAKE AS DIRECTED ON PACKAGE.  Dispense: 21 Tab; Refill: 0  - albuterol (PROVENTIL) 2.5mg/3ml Nebu Soln solution for nebulization; 3 mL by Nebulization route every four hours as needed for Shortness of Breath.  Dispense: 30 Bullet; Refill: 2      Discussed with her DDX, management options, and risks, benefits, and alternatives to treatment plan agreed upon.    Agreeable to medications prescribed.    Discussed expected course of duration, time for improvement, and to seek follow-up in Emergency Room, urgent care, or with PCP if getting worse at any time or not improving within expected time frame.

## 2020-04-30 ENCOUNTER — TELEMEDICINE (OUTPATIENT)
Dept: BEHAVIORAL HEALTH | Facility: CLINIC | Age: 25
End: 2020-04-30
Payer: COMMERCIAL

## 2020-04-30 DIAGNOSIS — F41.1 GAD (GENERALIZED ANXIETY DISORDER): ICD-10-CM

## 2020-04-30 PROCEDURE — 90834 PSYTX W PT 45 MINUTES: CPT | Mod: 95,CR | Performed by: MARRIAGE & FAMILY THERAPIST

## 2020-05-01 NOTE — BH THERAPY
[facility]  Therapy Progress Note    Patient Name: Zaynab Galarza  Patient MRN: [id number]  Today's Date: 4/30/2020     Type of session:Individual psychotherapy  Length of session: 45 minutes  Persons in attendance:Patient    Subjective/New Info: Met with the patient per their request via (HIPPA compliant) Zoom video conference to accommodate state imposed restrictions on social contact due to the COVID-19 pandemic. Zaynab has been sick with an non-covid respiratory illness which developed into pneumonia and was complicated by her asthma and treated poorly with a medication she is allergic to. She is recovering, but still coughing and tired. Her employer has closed the  where she works. She is getting a paycheck for now but isn't sure how long they will be able to do that. Her depression symptoms are improved, but her anxiety is increased. She is worried about her grandmother whose behavior has been erratic and irresponsible lately. Her mother works as a respiratory therapist in a hospital so she is at risk. Zaynab has had trouble sleeping partly due to her cough. We went over how to do a guided relaxation exercise with a sensory cue. She decided to use something tactile either a tassel or a soft ball. She did have a success in confronting a friend who said something unkind about her and that went very well. She is working on her assertive and conflict skills.    Objective/Observations:   Participation: Active verbal participation, Engaged and Open to feedback   Grooming: Casual and Neat   Cognition: Alert and Fully Oriented   Eye contact: Good   Mood: Anxious   Affect: Flexible and Congruent with content   Thought process: Logical   Speech: Rate within normal limits and Volume within normal limits   Other:     Diagnoses:   1. MONICA (generalized anxiety disorder)         Current risk:   SUICIDE: Not applicable   Homicide: Not applicable   Self-harm: Not applicable   Relapse: Not  applicable   Other:    Safety Plan reviewed? Not Indicated   If evidence of imminent risk is present, intervention/plan:     Therapeutic Intervention(s): Communication skills, Conflict resolution skills, Distress tolerance skills, Relaxation exercise and Stressors assessed  Treatment Goal(s)/Objective(s) addressed: Anxiety     Goal: Reduce anxiety and improve coping skills   Learn about the purpose and normal experience of anxious feelings.  Identify the experiences of anxiety that have excessive intensity in relation to the situation.  Recognize and plan for top three anxiety-provoking situations.  Identify cognitive distortions that amplify anxious feelings.  Modify cognitive distortions- identify and rehearse more logical responses to targeted situations.   Modify cognitive distortions of self concept to acknowledge resilience in dealing with stressors.  Develop strategies for thought distraction when negatively fixating on the future.   Practice relaxation exercise daily and pair with sensory cue.  Use sensory cue when confronting anxiety provoking situations.  Reduce and eventually eliminate excessive anxiety and panic attacks using emotional conditioning tools.     Progress toward Treatment Goals: Mild improvement    Plan:  - Next appointment scheduled:  Visit date not found  - Patient is in agreement with the above plan:  YES    NINI Soria  4/30/2020

## 2020-05-18 ENCOUNTER — OCCUPATIONAL MEDICINE (OUTPATIENT)
Dept: URGENT CARE | Facility: CLINIC | Age: 25
End: 2020-05-18
Payer: COMMERCIAL

## 2020-05-18 VITALS
SYSTOLIC BLOOD PRESSURE: 102 MMHG | DIASTOLIC BLOOD PRESSURE: 64 MMHG | HEIGHT: 62 IN | HEART RATE: 80 BPM | TEMPERATURE: 97.8 F | WEIGHT: 180 LBS | RESPIRATION RATE: 16 BRPM | BODY MASS INDEX: 33.13 KG/M2

## 2020-05-18 DIAGNOSIS — S66.912D STRAIN OF LEFT HAND, SUBSEQUENT ENCOUNTER: ICD-10-CM

## 2020-05-18 PROCEDURE — 99213 OFFICE O/P EST LOW 20 MIN: CPT | Performed by: NURSE PRACTITIONER

## 2020-05-18 ASSESSMENT — ENCOUNTER SYMPTOMS
MYALGIAS: 1
PSYCHIATRIC NEGATIVE: 1
CARDIOVASCULAR NEGATIVE: 1
NEUROLOGICAL NEGATIVE: 1
CONSTITUTIONAL NEGATIVE: 1
RESPIRATORY NEGATIVE: 1

## 2020-05-18 ASSESSMENT — PAIN SCALES - GENERAL: PAINLEVEL: 3=SLIGHT PAIN

## 2020-05-18 ASSESSMENT — FIBROSIS 4 INDEX: FIB4 SCORE: 0.52

## 2020-05-18 NOTE — LETTER
[facility]  [address], [address] [place] [address]  Phone:  [phone] - Fax:  [phone]   [facility] Progress Report and Disability Certification  Date of Service: 5/18/2020   No Show:  No  Date / Time of Next Visit:   Discharged / Care Transfer to Hand Surgery   Claim Information   Patient Name: Zaynab Galarza  Claim Number:     Employer: [employer]  Date of Injury: 2/5/2020     Insurer / TPA: Employers Insurance  ID / SSN:     Occupation: Teacher  Diagnosis: The encounter diagnosis was Strain of left hand, subsequent encounter.    Medical Information   Related to Industrial Injury? Yes    Subjective Complaints:  DOI: 02/05/20: . No other secondary employment. Today, two 2 year old children holding patient's left hand, one child holding her 5th finger, and the other holding fingers 2-4. They decided to pull patient's fingers apart.  She developed mild to severe pain shortly after, mild swelling.  Associated tingling to her third and fourth fingers.  No weakness.  Currently she has mild pain to her left hand exacerbated with gripping and movement.      Today reports no improvement. Patient was seen 2/12/20 because she fell on an outstretched left hand at work re injuring her left hand. She tripped over a small student who was riding a bike on the side walk.   Pain has continued to be described as intermittent, throbbing, sharp, tingling, and numbness to the fourth and fifth metacarpals. Patient denies weakness, swelling, or decreased ROM. Patient has been taking Ibuprofen with moderate relief of symptoms. Patient has been applying ice, which exacerbates her symptoms. Patient states that she rarely wearing wrist brace. Patient completed hand therapy with mild improvement. Patient is scheduled to see by Dr. Cochran 5/20/20. Patient is tolerating work restrictions. Plan of care discussed with patient.    Objective  Findings: Left Hand: Improved, limited flexion of 4th and 5th digit due to pain. Moderate TTP over 4th and 5th metacarpals. Skin without edema, ecchymosis, or erythema. Distal n/v intact in all digits.  2/12/2020 1:43 PM     HISTORY/REASON FOR EXAM:  Pain in left hand after ground-level fall        TECHNIQUE/EXAM DESCRIPTION AND NUMBER OF VIEWS:  3 views of the LEFT hand.     COMPARISON:  02/05/2020     FINDINGS:  Bone mineralization is normal.  There is no evidence of fracture or dislocation.  Soft tissues are normal.     IMPRESSION:     No evidence of fracture or dislocation.        MRI 2/22/20: Impression   Unremarkable left hand MRI exam without evidence of internal derangement in the left hand     Pre-Existing Condition(s):     Assessment:   Condition Same    Status: Discharged / Care Transfer  Permanent Disability:No    Plan: Transfer Care    Diagnostics:      Comments:  Keep appointment with hand surgery 5/20/20, if feels appropriate and no further treatment indicated ok to Discharged/MMI  Transfer care, hand surgery  Hand surgery, transfer care  Restricted duty, per hand surgery  Continue with ice as tolerated   Co  ntinue with OTC Ibuprofen as needed   Try warm water and Epson salt soaks for symptoms   Continue with gentle range of motion and stretching exercises as tolerated     Disability Information   Status: Released to Restricted Duty    From:  5/18/2020  Through:   Restrictions are: Temporary   Physical Restrictions   Sitting:    Standing:    Stooping:    Bending:      Squatting:    Walking:    Climbing:    Pushing:      Pulling:    Other:    Reaching Above Shoulder (L):   Reaching Above Shoulder (R):       Reaching Below Shoulder (L):    Reaching Below Shoulder (R):      Not to exceed Weight Limits   Carrying(hrs):   Weight Limit(lb): < or = to 25 pounds  Comments:Left hand only  Lifting(hrs):   Weight  Limit(lb): < or = to 25 pounds  Comments:Left hand only   Comments:      Repetitive Actions      Hands: i.e. Fine Manipulations from Grasping: < or = to 6 hrs/day  Comments:left hand only   Feet: i.e. Operating Foot Controls:     Driving / Operate Machinery:     Physician Name: JARVIS Dior Physician Signature:   e-Signature: Dr. Teodoro Skinner, Medical Director   Clinic Name / Location: [address]  [facility] [place] [address] Clinic Phone Number: Dept: [phone]   Appointment Time: 9:00 Am Visit Start Time: 9:01 AM   Check-In Time:  9:00 Am Visit Discharge Time:  9:42 AM   Original-Treating Physician or Chiropractor    Page 2-Insurer/TPA    Page 3-Employer    Page 4-Employee

## 2020-05-18 NOTE — PROGRESS NOTES
"Subjective:      Zaynab Galarza is a 24 y.o. female who presents with Follow-Up ( DOI -2/5/20 (L) Hand - Better - RM 04)      DOI: 02/05/20: . No other secondary employment. Today, two 2 year old children holding patient's left hand, one child holding her 5th finger, and the other holding fingers 2-4. They decided to pull patient's fingers apart.  She developed mild to severe pain shortly after, mild swelling.  Associated tingling to her third and fourth fingers.  No weakness.  Currently she has mild pain to her left hand exacerbated with gripping and movement.      Today reports no improvement. Patient was seen 2/12/20 because she fell on an outstretched left hand at work re injuring her left hand. She tripped over a small student who was riding a bike on the side walk.   Pain has continued to be described as intermittent, throbbing, sharp, tingling, and numbness to the fourth and fifth metacarpals. Patient denies weakness, swelling, or decreased ROM. Patient has been taking Ibuprofen with moderate relief of symptoms. Patient has been applying ice, which exacerbates her symptoms. Patient states that she rarely wearing wrist brace. Patient completed hand therapy with mild improvement. Patient is scheduled to see by Dr. Cochran 5/20/20. Patient is tolerating work restrictions. Plan of care discussed with patient.      HPI    Review of Systems   Constitutional: Negative.    Respiratory: Negative.    Cardiovascular: Negative.    Musculoskeletal: Positive for myalgias. Negative for joint pain.   Skin: Negative.    Neurological: Negative.    Psychiatric/Behavioral: Negative.         SOCHX: Works as a teacher at [employer]   FH: No pertinent family history to this problem.   Objective:     /64   Pulse 80   Temp 36.6 °C (97.8 °F) (Temporal)   Resp 16   Ht 1.575 m (5' 2\")   Wt 81.6 kg (180 lb)   BMI 32.92 kg/m²      Physical Exam  Constitutional:       General: She is not " in acute distress.     Appearance: Normal appearance. She is obese. She is not ill-appearing.   Cardiovascular:      Rate and Rhythm: Normal rate and regular rhythm.      Pulses: Normal pulses.   Pulmonary:      Effort: Pulmonary effort is normal.   Musculoskeletal: Normal range of motion.         General: Tenderness present. No swelling, deformity or signs of injury.   Skin:     General: Skin is warm and dry.      Capillary Refill: Capillary refill takes less than 2 seconds.      Findings: No bruising or erythema.   Neurological:      General: No focal deficit present.      Mental Status: She is alert and oriented to person, place, and time.      Cranial Nerves: No cranial nerve deficit.      Sensory: No sensory deficit.      Motor: No weakness.      Gait: Gait normal.   Psychiatric:         Mood and Affect: Mood normal.         Behavior: Behavior normal.         Left Hand: Improved, limited flexion of 4th and 5th digit due to pain. Moderate TTP over 4th and 5th metacarpals. Skin without edema, ecchymosis, or erythema. Distal n/v intact in all digits.  2/12/2020 1:43 PM     HISTORY/REASON FOR EXAM:  Pain in left hand after ground-level fall        TECHNIQUE/EXAM DESCRIPTION AND NUMBER OF VIEWS:  3 views of the LEFT hand.     COMPARISON:  02/05/2020     FINDINGS:  Bone mineralization is normal.  There is no evidence of fracture or dislocation.  Soft tissues are normal.     IMPRESSION:     No evidence of fracture or dislocation.        MRI 2/22/20: Impression   Unremarkable left hand MRI exam without evidence of internal derangement in the left hand         Assessment/Plan:       1. Strain of left hand, subsequent encounter  Keep appointment with hand surgery 5/20/20, if feels appropriate and no further treatment indicated ok to Discharged/MMI   Transfer care, hand surgery   Hand surgery, transfer care   Restricted duty, per hand surgery   Continue with ice as tolerated   Continue with OTC Ibuprofen as needed   Try  warm water and Epson salt soaks for symptoms   Continue with gentle range of motion and stretching exercises as tolerated

## 2020-06-19 ENCOUNTER — TELEMEDICINE (OUTPATIENT)
Dept: BEHAVIORAL HEALTH | Facility: CLINIC | Age: 25
End: 2020-06-19
Payer: COMMERCIAL

## 2020-06-19 DIAGNOSIS — F41.1 GAD (GENERALIZED ANXIETY DISORDER): ICD-10-CM

## 2020-06-19 PROCEDURE — 90834 PSYTX W PT 45 MINUTES: CPT | Mod: 95,CR | Performed by: MARRIAGE & FAMILY THERAPIST

## 2020-06-19 NOTE — BH THERAPY
[facility]  Therapy Progress Note    Patient Name: Zaynab Galarza  Patient MRN: [id number]  Today's Date: 6/19/2020     Type of session:Individual psychotherapy  Length of session: 45 minutes  Persons in attendance:Patient    Subjective/New Info: Met with the patient per their request and with their consent via (HIPPA compliant) Zoom video conference to accommodate state imposed restrictions on social contact due to the COVID-19 pandemic. Zaynab is back at work and has to wear a mask which has aggravated her asthma and her anxiety. She has a co-worker who is taking her 10 minute break often for hours at a time. We went over some assertive skills she could use when interacting with her co-worker so that she can feel less resentful and angry at being left alone for such long stretches. Her mood has been irritable, but she has been forgetting to take her Prozac every day. I stressed the importance of keeping on top of this to avoid withdrawal symptoms. Her injury to her hand has made it hard to find room for her other therapy appointments. The injury is also limiting her at work and causing her some chronic pain.    Objective/Observations:   Participation: Active verbal participation   Grooming: Neat   Cognition: Alert and Fully Oriented   Eye contact: Good   Mood: Euthymic   Affect: Flexible and Congruent with content   Thought process: Logical   Speech: Rate within normal limits and Volume within normal limits   Other:     Diagnoses:   1. MONICA (generalized anxiety disorder)         Current risk:   SUICIDE: Low   Homicide: Not applicable   Self-harm: Not applicable   Relapse: Not applicable   Other:    Safety Plan reviewed? Not Indicated   If evidence of imminent risk is present, intervention/plan:     Therapeutic Intervention(s): Communication skills, Conflict clarification, Conflict resolution skills and Limit-setting     Treatment Goal(s)/Objective(s) addressed: Assertive Skills    Goal: Acquire,  practice, and implement assertive skills.  Psychoeducation and roleplay of assertiveness skills in communication.   Identify needs and wants and communicate these in an effective manner.  Learn difference between aggressive, passive, passive/aggressive, and assertive behavior.  Identify situations in which assertive skills should be exercised to maintain personal boundaries.  Identify situations where there is a deliberate choice not to implement assertive skills for personal safety or well-being.  Expand repertoire of assertive techniques.  Mentally practice assertive techniques for potentially challenging situations.  Acknowledge personal autonomy and right to set personal boundaries.  Recognize assertive communication in others and respond in a non-defensive, effective manner.     Progress toward Treatment Goals: Mild improvement    Plan:  - Patient plans to call for appointments as needed.    LOPEZ Soria.  6/19/2020

## 2020-08-14 ENCOUNTER — NON-PROVIDER VISIT (OUTPATIENT)
Dept: OCCUPATIONAL MEDICINE | Facility: CLINIC | Age: 25
End: 2020-08-14

## 2020-08-14 DIAGNOSIS — Z11.1 ENCOUNTER FOR PPD TEST: Primary | ICD-10-CM

## 2020-08-14 PROCEDURE — 86580 TB INTRADERMAL TEST: CPT | Performed by: NURSE PRACTITIONER

## 2020-08-29 ENCOUNTER — APPOINTMENT (OUTPATIENT)
Dept: URGENT CARE | Facility: CLINIC | Age: 25
End: 2020-08-29
Payer: COMMERCIAL

## 2020-09-08 ENCOUNTER — NON-PROVIDER VISIT (OUTPATIENT)
Dept: OCCUPATIONAL MEDICINE | Facility: CLINIC | Age: 25
End: 2020-09-08

## 2020-09-08 DIAGNOSIS — Z11.1 ENCOUNTER FOR PPD TEST: ICD-10-CM

## 2020-09-08 PROCEDURE — 86580 TB INTRADERMAL TEST: CPT | Performed by: PREVENTIVE MEDICINE

## 2020-09-11 ENCOUNTER — NON-PROVIDER VISIT (OUTPATIENT)
Dept: OCCUPATIONAL MEDICINE | Facility: CLINIC | Age: 25
End: 2020-09-11

## 2020-09-11 DIAGNOSIS — Z11.1 ENCOUNTER FOR PPD SKIN TEST READING: ICD-10-CM

## 2020-09-11 LAB — TB WHEAL 3D P 5 TU DIAM: NORMAL MM

## 2021-08-23 ENCOUNTER — TELEPHONE (OUTPATIENT)
Dept: SCHEDULING | Facility: IMAGING CENTER | Age: 26
End: 2021-08-23

## 2021-08-31 ENCOUNTER — OFFICE VISIT (OUTPATIENT)
Dept: MEDICAL GROUP | Facility: CLINIC | Age: 26
End: 2021-08-31
Payer: COMMERCIAL

## 2021-08-31 VITALS
TEMPERATURE: 97.9 F | WEIGHT: 189 LBS | OXYGEN SATURATION: 97 % | SYSTOLIC BLOOD PRESSURE: 122 MMHG | RESPIRATION RATE: 16 BRPM | BODY MASS INDEX: 34.78 KG/M2 | HEIGHT: 62 IN | HEART RATE: 60 BPM | DIASTOLIC BLOOD PRESSURE: 80 MMHG

## 2021-08-31 DIAGNOSIS — R68.89 INTOLERANCE TO COLD: ICD-10-CM

## 2021-08-31 DIAGNOSIS — E55.9 VITAMIN D DEFICIENCY: ICD-10-CM

## 2021-08-31 DIAGNOSIS — R61 NIGHT SWEATS: ICD-10-CM

## 2021-08-31 DIAGNOSIS — Z86.2 HISTORY OF ANEMIA: ICD-10-CM

## 2021-08-31 DIAGNOSIS — Z83.49 FAMILY HISTORY OF MTHFR DEFICIENCY: ICD-10-CM

## 2021-08-31 DIAGNOSIS — L65.9 HAIR LOSS: ICD-10-CM

## 2021-08-31 DIAGNOSIS — Z00.00 ROUTINE PHYSICAL EXAMINATION: ICD-10-CM

## 2021-08-31 DIAGNOSIS — J45.31 MILD PERSISTENT ASTHMA WITH ACUTE EXACERBATION: ICD-10-CM

## 2021-08-31 PROCEDURE — 99214 OFFICE O/P EST MOD 30 MIN: CPT | Performed by: PHYSICIAN ASSISTANT

## 2021-08-31 RX ORDER — ALBUTEROL SULFATE 2.5 MG/3ML
2.5 SOLUTION RESPIRATORY (INHALATION) EVERY 4 HOURS PRN
Qty: 540 ML | Refills: 3 | Status: SHIPPED | OUTPATIENT
Start: 2021-08-31 | End: 2022-01-04 | Stop reason: SDUPTHER

## 2021-08-31 RX ORDER — BUDESONIDE AND FORMOTEROL FUMARATE DIHYDRATE 80; 4.5 UG/1; UG/1
2 AEROSOL RESPIRATORY (INHALATION) 2 TIMES DAILY
Qty: 10.2 G | Refills: 3 | Status: SHIPPED | OUTPATIENT
Start: 2021-08-31 | End: 2022-01-05 | Stop reason: SDUPTHER

## 2021-08-31 RX ORDER — MONTELUKAST SODIUM 10 MG/1
10 TABLET ORAL DAILY
Qty: 90 TABLET | Refills: 1 | Status: SHIPPED | OUTPATIENT
Start: 2021-08-31 | End: 2022-01-04 | Stop reason: SDUPTHER

## 2021-08-31 RX ORDER — CETIRIZINE HYDROCHLORIDE 10 MG/1
10 TABLET ORAL DAILY
Qty: 90 TABLET | Refills: 1 | Status: SHIPPED | OUTPATIENT
Start: 2021-08-31 | End: 2022-08-25

## 2021-08-31 RX ORDER — ALBUTEROL SULFATE 90 UG/1
1-2 AEROSOL, METERED RESPIRATORY (INHALATION) EVERY 4 HOURS PRN
Qty: 8 G | Refills: 3 | Status: SHIPPED | OUTPATIENT
Start: 2021-08-31 | End: 2022-01-04 | Stop reason: SDUPTHER

## 2021-08-31 RX ORDER — BUDESONIDE AND FORMOTEROL FUMARATE DIHYDRATE 160; 4.5 UG/1; UG/1
2 AEROSOL RESPIRATORY (INHALATION) 2 TIMES DAILY
COMMUNITY
End: 2021-08-31 | Stop reason: SDUPTHER

## 2021-08-31 ASSESSMENT — PATIENT HEALTH QUESTIONNAIRE - PHQ9
6. FEELING BAD ABOUT YOURSELF - OR THAT YOU ARE A FAILURE OR HAVE LET YOURSELF OR YOUR FAMILY DOWN: NOT AL ALL
9. THOUGHTS THAT YOU WOULD BE BETTER OFF DEAD, OR OF HURTING YOURSELF: NOT AT ALL
SUM OF ALL RESPONSES TO PHQ9 QUESTIONS 1 AND 2: 0
2. FEELING DOWN, DEPRESSED, IRRITABLE, OR HOPELESS: NOT AT ALL
4. FEELING TIRED OR HAVING LITTLE ENERGY: MORE THAN HALF THE DAYS
5. POOR APPETITE OR OVEREATING: NOT AT ALL
8. MOVING OR SPEAKING SO SLOWLY THAT OTHER PEOPLE COULD HAVE NOTICED. OR THE OPPOSITE, BEING SO FIGETY OR RESTLESS THAT YOU HAVE BEEN MOVING AROUND A LOT MORE THAN USUAL: NOT AT ALL
7. TROUBLE CONCENTRATING ON THINGS, SUCH AS READING THE NEWSPAPER OR WATCHING TELEVISION: SEVERAL DAYS
3. TROUBLE FALLING OR STAYING ASLEEP OR SLEEPING TOO MUCH: MORE THAN HALF THE DAYS
CLINICAL INTERPRETATION OF PHQ2 SCORE: 0
SUM OF ALL RESPONSES TO PHQ QUESTIONS 1-9: 5
1. LITTLE INTEREST OR PLEASURE IN DOING THINGS: NOT AT ALL

## 2021-08-31 ASSESSMENT — FIBROSIS 4 INDEX: FIB4 SCORE: 0.57

## 2021-09-09 ENCOUNTER — TELEPHONE (OUTPATIENT)
Dept: MEDICAL GROUP | Facility: CLINIC | Age: 26
End: 2021-09-09

## 2021-09-09 ENCOUNTER — HOSPITAL ENCOUNTER (OUTPATIENT)
Dept: LAB | Facility: MEDICAL CENTER | Age: 26
End: 2021-09-09
Attending: PHYSICIAN ASSISTANT
Payer: COMMERCIAL

## 2021-09-09 DIAGNOSIS — Z00.00 ROUTINE PHYSICAL EXAMINATION: ICD-10-CM

## 2021-09-09 DIAGNOSIS — E55.9 VITAMIN D DEFICIENCY: ICD-10-CM

## 2021-09-09 DIAGNOSIS — R68.89 INTOLERANCE TO COLD: ICD-10-CM

## 2021-09-09 DIAGNOSIS — R61 NIGHT SWEATS: ICD-10-CM

## 2021-09-09 DIAGNOSIS — L65.9 HAIR LOSS: ICD-10-CM

## 2021-09-09 DIAGNOSIS — Z86.2 HISTORY OF ANEMIA: ICD-10-CM

## 2021-09-09 DIAGNOSIS — Z83.49 FAMILY HISTORY OF MTHFR DEFICIENCY: ICD-10-CM

## 2021-09-09 LAB
25(OH)D3 SERPL-MCNC: 26 NG/ML (ref 30–100)
ALBUMIN SERPL BCP-MCNC: 4.4 G/DL (ref 3.2–4.9)
ALBUMIN/GLOB SERPL: 1.5 G/DL
ALP SERPL-CCNC: 80 U/L (ref 30–99)
ALT SERPL-CCNC: 14 U/L (ref 2–50)
ANION GAP SERPL CALC-SCNC: 11 MMOL/L (ref 7–16)
APTT PPP: 49.1 SEC (ref 24.7–36)
AST SERPL-CCNC: 16 U/L (ref 12–45)
BASOPHILS # BLD AUTO: 1.1 % (ref 0–1.8)
BASOPHILS # BLD: 0.08 K/UL (ref 0–0.12)
BILIRUB SERPL-MCNC: 0.5 MG/DL (ref 0.1–1.5)
BUN SERPL-MCNC: 9 MG/DL (ref 8–22)
CALCIUM SERPL-MCNC: 9.7 MG/DL (ref 8.5–10.5)
CHLORIDE SERPL-SCNC: 103 MMOL/L (ref 96–112)
CO2 SERPL-SCNC: 23 MMOL/L (ref 20–33)
CREAT SERPL-MCNC: 0.6 MG/DL (ref 0.5–1.4)
EOSINOPHIL # BLD AUTO: 0.11 K/UL (ref 0–0.51)
EOSINOPHIL NFR BLD: 1.5 % (ref 0–6.9)
ERYTHROCYTE [DISTWIDTH] IN BLOOD BY AUTOMATED COUNT: 43.9 FL (ref 35.9–50)
FOLATE SERPL-MCNC: 7 NG/ML
GLOBULIN SER CALC-MCNC: 2.9 G/DL (ref 1.9–3.5)
GLUCOSE SERPL-MCNC: 89 MG/DL (ref 65–99)
HCT VFR BLD AUTO: 41.7 % (ref 37–47)
HGB BLD-MCNC: 13.7 G/DL (ref 12–16)
IMM GRANULOCYTES # BLD AUTO: 0.02 K/UL (ref 0–0.11)
IMM GRANULOCYTES NFR BLD AUTO: 0.3 % (ref 0–0.9)
IRON SATN MFR SERPL: 44 % (ref 15–55)
IRON SERPL-MCNC: 136 UG/DL (ref 40–170)
LYMPHOCYTES # BLD AUTO: 2.67 K/UL (ref 1–4.8)
LYMPHOCYTES NFR BLD: 37.2 % (ref 22–41)
MCH RBC QN AUTO: 30.8 PG (ref 27–33)
MCHC RBC AUTO-ENTMCNC: 32.9 G/DL (ref 33.6–35)
MCV RBC AUTO: 93.7 FL (ref 81.4–97.8)
MONOCYTES # BLD AUTO: 0.63 K/UL (ref 0–0.85)
MONOCYTES NFR BLD AUTO: 8.8 % (ref 0–13.4)
NEUTROPHILS # BLD AUTO: 3.67 K/UL (ref 2–7.15)
NEUTROPHILS NFR BLD: 51.1 % (ref 44–72)
NRBC # BLD AUTO: 0 K/UL
NRBC BLD-RTO: 0 /100 WBC
PLATELET # BLD AUTO: 271 K/UL (ref 164–446)
PMV BLD AUTO: 10.4 FL (ref 9–12.9)
POTASSIUM SERPL-SCNC: 3.6 MMOL/L (ref 3.6–5.5)
PROT SERPL-MCNC: 7.3 G/DL (ref 6–8.2)
RBC # BLD AUTO: 4.45 M/UL (ref 4.2–5.4)
SODIUM SERPL-SCNC: 137 MMOL/L (ref 135–145)
T4 FREE SERPL-MCNC: 1.08 NG/DL (ref 0.93–1.7)
TIBC SERPL-MCNC: 307 UG/DL (ref 250–450)
TSH SERPL DL<=0.005 MIU/L-ACNC: 2.02 UIU/ML (ref 0.38–5.33)
UIBC SERPL-MCNC: 171 UG/DL (ref 110–370)
VIT B12 SERPL-MCNC: 597 PG/ML (ref 211–911)
WBC # BLD AUTO: 7.2 K/UL (ref 4.8–10.8)

## 2021-09-09 PROCEDURE — 82306 VITAMIN D 25 HYDROXY: CPT

## 2021-09-09 PROCEDURE — 82607 VITAMIN B-12: CPT

## 2021-09-09 PROCEDURE — 81240 F2 GENE: CPT

## 2021-09-09 PROCEDURE — 85300 ANTITHROMBIN III ACTIVITY: CPT

## 2021-09-09 PROCEDURE — 85025 COMPLETE CBC W/AUTO DIFF WBC: CPT

## 2021-09-09 PROCEDURE — 84443 ASSAY THYROID STIM HORMONE: CPT

## 2021-09-09 PROCEDURE — 85730 THROMBOPLASTIN TIME PARTIAL: CPT

## 2021-09-09 PROCEDURE — 83540 ASSAY OF IRON: CPT

## 2021-09-09 PROCEDURE — 36415 COLL VENOUS BLD VENIPUNCTURE: CPT

## 2021-09-09 PROCEDURE — 85303 CLOT INHIBIT PROT C ACTIVITY: CPT

## 2021-09-09 PROCEDURE — 83550 IRON BINDING TEST: CPT

## 2021-09-09 PROCEDURE — 82746 ASSAY OF FOLIC ACID SERUM: CPT

## 2021-09-09 PROCEDURE — 81241 F5 GENE: CPT

## 2021-09-09 PROCEDURE — 85306 CLOT INHIBIT PROT S FREE: CPT

## 2021-09-09 PROCEDURE — 84439 ASSAY OF FREE THYROXINE: CPT

## 2021-09-09 PROCEDURE — 80053 COMPREHEN METABOLIC PANEL: CPT

## 2021-09-09 ASSESSMENT — ENCOUNTER SYMPTOMS
NEUROLOGICAL NEGATIVE: 1
GASTROINTESTINAL NEGATIVE: 1
PSYCHIATRIC NEGATIVE: 1
CARDIOVASCULAR NEGATIVE: 1
EYES NEGATIVE: 1
COUGH: 1
WHEEZING: 1
MUSCULOSKELETAL NEGATIVE: 1
SHORTNESS OF BREATH: 1
CONSTITUTIONAL NEGATIVE: 1

## 2021-09-09 ASSESSMENT — VISUAL ACUITY: OU: 1

## 2021-09-09 NOTE — PROGRESS NOTES
Subjective   Zaynab Galarza is a 26 y.o. female who presents with Establish Care and Asthma    1. Routine physical examination  Here today to establish care and get a routine physical exam.  - CBC WITH DIFFERENTIAL; Future  - Comp Metabolic Panel; Future  - VITAMIN D,25 HYDROXY; Future    2. Mild persistent asthma with acute exacerbation  Chronic condition with current exacerbation of symptoms due to current air quality. Requesting refills of medications.  - montelukast (SINGULAIR) 10 MG Tab; Take 1 Tablet by mouth every day.  Dispense: 90 Tablet; Refill: 1  - cetirizine (ZYRTEC) 10 MG Tab; Take 1 Tablet by mouth every day.  Dispense: 90 Tablet; Refill: 1  - budesonide-formoterol (SYMBICORT) 80-4.5 MCG/ACT Aerosol; Inhale 2 Puffs 2 times a day.  Dispense: 10.2 g; Refill: 3  - albuterol 108 (90 Base) MCG/ACT Aero Soln inhalation aerosol; Inhale 1-2 Puffs every four hours as needed for Shortness of Breath.  Dispense: 8 g; Refill: 3  - albuterol (PROVENTIL) 2.5mg/3ml Nebu Soln solution for nebulization; Take 3 mL by nebulization every four hours as needed for Shortness of Breath.  Dispense: 540 mL; Refill: 3    3. Family history of MTHFR deficiency  Was recommended that she get tested for clotting deficiency. Will review results in two weeks.  - APTT; Future  - AT-III FUNCTIONAL; Future  - FACTOR V LEIDEN MUTATION; Future  - FACTOR II DNA ANALYSIS; Future  - PROTEIN C FUNCTIONAL; Future  - PROTEIN S FUNCTIONAL; Future    4. History of anemia  Due for labs to assess levels.  - CBC WITH DIFFERENTIAL; Future  - IRON/TOTAL IRON BIND; Future  - FOLATE; Future  - VITAMIN B12; Future    5. Vitamin D deficiency  Chronic condition. Not on supplementation. Due for labs to check levels.  - VITAMIN D,25 HYDROXY; Future    6. Intolerance to cold  States that she has no tolerance to cold. She wears sweatshirts while indoors In the summer. States that she is always cold.  - FREE THYROXINE; Future  - TSH; Future    7. Night  sweats  Has been happening intermittently for the last few months.  - FREE THYROXINE; Future  - TSH; Future    8. Hair loss  States that she feels like it is falling out by the handful and that her hair is not as thick as it was previously.  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  Past Surgical History:  No date: CARPAL TUNNEL RELEASE; Left  No date: DENTAL EXTRACTION(S)      Comment:  [place]  No date: TONSILLECTOMY  Social History    Tobacco Use      Smoking status: Never Smoker      Smokeless tobacco: Never Used    Vaping Use      Vaping Use: Former        Start date: 2/12/2018        Quit date: 5/31/2021        Substances: THC    Alcohol use: Yes      Alcohol/week: 0.6 oz      Types: 1 Glasses of wine per week      Comment: occ    Drug use: Not Currently      Types: Marijuana, Inhaled      Comment: vape pen quit may 21    Review of patient's family history indicates:  Problem: Other      Relation: Mother          Age of Onset: (Not Specified)          Comment: MTHFR  Problem: Arthritis      Relation: Mother          Age of Onset: (Not Specified)  Problem: Anemia      Relation: Mother          Age of Onset: (Not Specified)  Problem: Psychiatric Illness      Relation: Mother          Age of Onset: (Not Specified)          Comment: Bipolar  Problem: Diabetes      Relation: Father          Age of Onset: (Not Specified)  Problem: Other      Relation: Father          Age of Onset: (Not Specified)          Comment: MTHFR  Problem: Bipolar disorder      Relation: Sister          Age of Onset: (Not Specified)  Problem: Lupus      Relation: Sister          Age of Onset: (Not Specified)  Problem: Other      Relation: Sister          Age of Onset: (Not Specified)          Comment: MTHFR  Problem: No Known Problems      Relation: Brother           Age of Onset: (Not Specified)  Problem: Diabetes      Relation: Maternal Aunt          Age of Onset: (Not Specified)          Comment: ovarian  Problem: Diabetes      Relation: Paternal Grandmother          Age of Onset: (Not Specified)          Comment: breast  Problem: Cancer      Relation: Paternal Grandmother          Age of Onset: (Not Specified)          Comment: breast  Problem: Lung Disease      Relation: Paternal Grandmother          Age of Onset: (Not Specified)          Comment: COPD  Problem: Arthritis      Relation: Maternal Grandmother          Age of Onset: (Not Specified)  Problem: Obesity      Relation: Maternal Grandmother          Age of Onset: (Not Specified)  Problem: Cancer      Relation: Paternal Grandfather          Age of Onset: (Not Specified)          Comment: lung      Current Outpatient Medications: •  montelukast (SINGULAIR) 10 MG Tab, Take 1 Tablet by mouth every day., Disp: 90 Tablet, Rfl: 1•  cetirizine (ZYRTEC) 10 MG Tab, Take 1 Tablet by mouth every day., Disp: 90 Tablet, Rfl: 1•  budesonide-formoterol (SYMBICORT) 80-4.5 MCG/ACT Aerosol, Inhale 2 Puffs 2 times a day., Disp: 10.2 g, Rfl: 3•  albuterol 108 (90 Base) MCG/ACT Aero Soln inhalation aerosol, Inhale 1-2 Puffs every four hours as needed for Shortness of Breath., Disp: 8 g, Rfl: 3•  albuterol (PROVENTIL) 2.5mg/3ml Nebu Soln solution for nebulization, Take 3 mL by nebulization every four hours as needed for Shortness of Breath., Disp: 540 mL, Rfl: 3    Patient was instructed on the use of medications, either prescriptions or OTC and informed on when the appropriate follow up time period should be. In addition, patient was also instructed that should any acute worsening occur that they should notify this clinic asap or call 911.      Review of Systems   Constitutional: Negative.    HENT: Negative.    Eyes: Negative.    Respiratory: Positive for cough, shortness of breath and wheezing.    Cardiovascular: Negative.   "  Gastrointestinal: Negative.    Genitourinary: Negative.    Musculoskeletal: Negative.    Skin: Negative.    Neurological: Negative.    Endo/Heme/Allergies: Negative.    Psychiatric/Behavioral: Negative.      Objective     /80 (BP Location: Left arm, Patient Position: Sitting, BP Cuff Size: Adult)   Pulse 60   Temp 36.6 °C (97.9 °F) (Temporal)   Resp 16   Ht 1.575 m (5' 2\") Comment: pt reported  Wt 85.7 kg (189 lb) Comment: with shoes on  SpO2 97%   BMI 34.57 kg/m²      Physical Exam  Vitals and nursing note reviewed.   Constitutional:       Appearance: Normal appearance. She is well-developed and well-groomed.   HENT:      Head: Normocephalic and atraumatic.      Nose: Nose normal.      Mouth/Throat:      Lips: Pink. No lesions.      Mouth: Mucous membranes are moist.   Eyes:      General: Lids are normal. Vision grossly intact. Gaze aligned appropriately.      Extraocular Movements: Extraocular movements intact.      Conjunctiva/sclera: Conjunctivae normal.      Pupils: Pupils are equal, round, and reactive to light.   Neck:      Thyroid: No thyromegaly.      Vascular: No carotid bruit or JVD.      Trachea: Trachea and phonation normal.   Cardiovascular:      Rate and Rhythm: Normal rate and regular rhythm.      Heart sounds: Normal heart sounds. No murmur heard.   No friction rub. No gallop.    Pulmonary:      Effort: Pulmonary effort is normal.      Breath sounds: Examination of the right-middle field reveals wheezing. Examination of the left-middle field reveals wheezing. Examination of the right-lower field reveals wheezing. Examination of the left-lower field reveals wheezing. Wheezing present. No rhonchi or rales.   Musculoskeletal:         General: Normal range of motion.      Cervical back: Normal range of motion and neck supple.      Right lower leg: No edema.      Left lower leg: No edema.   Lymphadenopathy:      Cervical: No cervical adenopathy.   Skin:     General: Skin is warm and dry. "      Capillary Refill: Capillary refill takes less than 2 seconds.      Findings: No lesion or rash.   Neurological:      Mental Status: She is alert and oriented to person, place, and time.      Cranial Nerves: Cranial nerves are intact.   Psychiatric:         Attention and Perception: Attention and perception normal.         Mood and Affect: Mood and affect normal.         Speech: Speech normal.         Behavior: Behavior normal. Behavior is cooperative.         Thought Content: Thought content normal.         Judgment: Judgment normal.       Assessment & Plan      1. Routine physical examination  - CBC WITH DIFFERENTIAL; Future  - Comp Metabolic Panel; Future  - VITAMIN D,25 HYDROXY; Future    2. Mild persistent asthma with acute exacerbation  - montelukast (SINGULAIR) 10 MG Tab; Take 1 Tablet by mouth every day.  Dispense: 90 Tablet; Refill: 1  - cetirizine (ZYRTEC) 10 MG Tab; Take 1 Tablet by mouth every day.  Dispense: 90 Tablet; Refill: 1  - budesonide-formoterol (SYMBICORT) 80-4.5 MCG/ACT Aerosol; Inhale 2 Puffs 2 times a day.  Dispense: 10.2 g; Refill: 3  - albuterol 108 (90 Base) MCG/ACT Aero Soln inhalation aerosol; Inhale 1-2 Puffs every four hours as needed for Shortness of Breath.  Dispense: 8 g; Refill: 3  - albuterol (PROVENTIL) 2.5mg/3ml Nebu Soln solution for nebulization; Take 3 mL by nebulization every four hours as needed for Shortness of Breath.  Dispense: 540 mL; Refill: 3    3. Family history of MTHFR deficiency  - APTT; Future  - AT-III FUNCTIONAL; Future  - FACTOR V LEIDEN MUTATION; Future  - FACTOR II DNA ANALYSIS; Future  - PROTEIN C FUNCTIONAL; Future  - PROTEIN S FUNCTIONAL; Future    4. History of anemia  - CBC WITH DIFFERENTIAL; Future  - IRON/TOTAL IRON BIND; Future  - FOLATE; Future  - VITAMIN B12; Future    5. Vitamin D deficiency  - VITAMIN D,25 HYDROXY; Future    6. Intolerance to cold  - FREE THYROXINE; Future  - TSH; Future    7. Night sweats  - FREE THYROXINE; Future  - TSH;  Future    8. Hair loss  - FREE THYROXINE; Future  - TSH; Future

## 2021-09-09 NOTE — TELEPHONE ENCOUNTER
DOCUMENTATION OF PAR STATUS:    1. Name of Medication & Dose: Budesonide-Formoterol     2. Name of Prescription Coverage Company & phone #: [employer]    3. Date Prior Auth Submitted: 9.9.21    4. What information was given to obtain insurance decision? Clinical Notes    5. Prior Auth Status? Pending    6. Patient Notified: N\A

## 2021-09-12 LAB
PROT C ACT/NOR PPP: 170 % (ref 83–168)
PROT S ACT/NOR PPP: 76 % (ref 57–131)

## 2021-09-13 LAB
AT III ACT/NOR PPP CHRO: 102 % (ref 76–128)
F2 C.20210G>A GENO BLD/T: NEGATIVE

## 2021-09-14 ENCOUNTER — OFFICE VISIT (OUTPATIENT)
Dept: MEDICAL GROUP | Facility: CLINIC | Age: 26
End: 2021-09-14
Payer: COMMERCIAL

## 2021-09-14 VITALS
TEMPERATURE: 98.8 F | DIASTOLIC BLOOD PRESSURE: 78 MMHG | SYSTOLIC BLOOD PRESSURE: 124 MMHG | HEIGHT: 63 IN | RESPIRATION RATE: 16 BRPM | BODY MASS INDEX: 33.95 KG/M2 | OXYGEN SATURATION: 97 % | WEIGHT: 191.6 LBS | HEART RATE: 71 BPM

## 2021-09-14 DIAGNOSIS — Z83.49 FAMILY HISTORY OF MTHFR DEFICIENCY: ICD-10-CM

## 2021-09-14 DIAGNOSIS — E55.9 VITAMIN D DEFICIENCY: ICD-10-CM

## 2021-09-14 LAB — F5 P.R506Q BLD/T QL: NEGATIVE

## 2021-09-14 PROCEDURE — 99214 OFFICE O/P EST MOD 30 MIN: CPT | Performed by: PHYSICIAN ASSISTANT

## 2021-09-14 ASSESSMENT — ENCOUNTER SYMPTOMS
SHORTNESS OF BREATH: 1
EYES NEGATIVE: 1
WHEEZING: 1
CONSTITUTIONAL NEGATIVE: 1
NERVOUS/ANXIOUS: 1
MUSCULOSKELETAL NEGATIVE: 1
GASTROINTESTINAL NEGATIVE: 1
CARDIOVASCULAR NEGATIVE: 1
NEUROLOGICAL NEGATIVE: 1
DEPRESSION: 1

## 2021-09-14 ASSESSMENT — VISUAL ACUITY: OU: 1

## 2021-09-14 ASSESSMENT — FIBROSIS 4 INDEX: FIB4 SCORE: 0.41

## 2021-09-14 NOTE — ASSESSMENT & PLAN NOTE
Her insurance will not cover genetic testing.  All clotting factors are within normal limits except for a slightly elevated protein C and APTT. Has been given information for genetic testing if she wants to pay out-of-pocket. Ordered homocystine level and will f/u with results. B12, folate and CBC are within normal limits.

## 2021-09-14 NOTE — PROGRESS NOTES
Subjective   Zaynab Galarza is a 26 y.o. female who presents with Follow-Up (lab results)    1. Family history of MTHFR deficiency  B12, folate and CBC are within normal limits. Ordered homocystine level and will f/u with results. All clotting factors are within normal limits except for a slightly elevated protein C and APTT. Her insurance will not cover genetic testing. Has been given information for genetic testing if she wants to pay out-of-pocket.    - HOMOCYSTINE URINE    2. Vitamin D deficiency  Chronic condition.  Not currently on supplementation.  Vitamin D level slightly low at 26.  She will start vitamin D3 2000 unit daily and we will recheck labs in 1 year.    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  Past Surgical History:  No date: CARPAL TUNNEL RELEASE; Left  No date: DENTAL EXTRACTION(S)      Comment:  [place]  No date: TONSILLECTOMY  Social History    Tobacco Use      Smoking status: Never Smoker      Smokeless tobacco: Never Used    Vaping Use      Vaping Use: Former        Start date: 2/12/2018        Quit date: 5/31/2021        Substances: THC    Alcohol use: Yes      Alcohol/week: 0.6 oz      Types: 1 Glasses of wine per week      Comment: occ    Drug use: Not Currently      Types: Marijuana, Inhaled      Comment: vape pen quit may 21    Review of patient's family history indicates:  Problem: Other      Relation: Mother          Age of Onset: (Not Specified)          Comment: MTHFR  Problem: Arthritis      Relation: Mother          Age of Onset: (Not Specified)  Problem: Anemia      Relation: Mother          Age of Onset: (Not Specified)  Problem: Psychiatric Illness      Relation: Mother          Age of Onset: (Not Specified)          Comment: Bipolar  Problem: Diabetes      Relation: Father          Age of Onset: (Not Specified)  Problem: Other       Relation: Father          Age of Onset: (Not Specified)          Comment: MTHFR  Problem: Bipolar disorder      Relation: Sister          Age of Onset: (Not Specified)  Problem: Lupus      Relation: Sister          Age of Onset: (Not Specified)  Problem: Other      Relation: Sister          Age of Onset: (Not Specified)          Comment: MTHFR  Problem: No Known Problems      Relation: Brother          Age of Onset: (Not Specified)  Problem: Diabetes      Relation: Maternal Aunt          Age of Onset: (Not Specified)          Comment: ovarian  Problem: Diabetes      Relation: Paternal Grandmother          Age of Onset: (Not Specified)          Comment: breast  Problem: Cancer      Relation: Paternal Grandmother          Age of Onset: (Not Specified)          Comment: breast  Problem: Lung Disease      Relation: Paternal Grandmother          Age of Onset: (Not Specified)          Comment: COPD  Problem: Arthritis      Relation: Maternal Grandmother          Age of Onset: (Not Specified)  Problem: Obesity      Relation: Maternal Grandmother          Age of Onset: (Not Specified)  Problem: Cancer      Relation: Paternal Grandfather          Age of Onset: (Not Specified)          Comment: lung      Current Outpatient Medications: •  montelukast (SINGULAIR) 10 MG Tab, Take 1 Tablet by mouth every day., Disp: 90 Tablet, Rfl: 1•  cetirizine (ZYRTEC) 10 MG Tab, Take 1 Tablet by mouth every day., Disp: 90 Tablet, Rfl: 1•  budesonide-formoterol (SYMBICORT) 80-4.5 MCG/ACT Aerosol, Inhale 2 Puffs 2 times a day., Disp: 10.2 g, Rfl: 3•  albuterol 108 (90 Base) MCG/ACT Aero Soln inhalation aerosol, Inhale 1-2 Puffs every four hours as needed for Shortness of Breath., Disp: 8 g, Rfl: 3•  albuterol (PROVENTIL) 2.5mg/3ml Nebu Soln solution for nebulization, Take 3 mL by nebulization every four hours as needed for Shortness of Breath., Disp: 540 mL, Rfl: 3    Patient was instructed on the use of medications, either prescriptions or  "OTC and informed on when the appropriate follow up time period should be. In addition, patient was also instructed that should any acute worsening occur that they should notify this clinic asap or call 911.      Review of Systems   Constitutional: Negative.    HENT: Negative.    Eyes: Negative.    Respiratory: Positive for shortness of breath and wheezing.    Cardiovascular: Negative.    Gastrointestinal: Negative.    Genitourinary: Negative.    Musculoskeletal: Negative.    Skin: Negative.    Neurological: Negative.    Endo/Heme/Allergies: Negative.    Psychiatric/Behavioral: Positive for depression. The patient is nervous/anxious.      Objective     /78 (BP Location: Left arm, Patient Position: Sitting, BP Cuff Size: Large adult)   Pulse 71   Temp 37.1 °C (98.8 °F) (Temporal)   Resp 16   Ht 1.588 m (5' 2.5\") Comment: stated by pt  Wt 86.9 kg (191 lb 9.6 oz)   LMP 08/27/2021 (Approximate)   SpO2 97%   BMI 34.49 kg/m²      Physical Exam  Vitals and nursing note reviewed.   Constitutional:       General: She is not in acute distress.     Appearance: Normal appearance. She is well-developed and well-groomed. She is obese. She is not ill-appearing.   HENT:      Head: Normocephalic and atraumatic.      Nose: Nose normal.      Mouth/Throat:      Lips: Pink. No lesions.      Mouth: Mucous membranes are moist.   Eyes:      General: Lids are normal. Vision grossly intact. Gaze aligned appropriately.      Extraocular Movements: Extraocular movements intact.      Conjunctiva/sclera: Conjunctivae normal.      Pupils: Pupils are equal, round, and reactive to light.   Neck:      Thyroid: No thyromegaly.      Vascular: No carotid bruit or JVD.      Trachea: Trachea and phonation normal.   Cardiovascular:      Rate and Rhythm: Normal rate and regular rhythm.      Heart sounds: Normal heart sounds. No murmur heard.   No friction rub. No gallop.    Pulmonary:      Effort: Pulmonary effort is normal.      Breath sounds: " Normal breath sounds. No wheezing, rhonchi or rales.   Musculoskeletal:         General: Normal range of motion.      Cervical back: Normal range of motion and neck supple.      Right lower leg: No edema.      Left lower leg: No edema.   Lymphadenopathy:      Cervical: No cervical adenopathy.   Skin:     General: Skin is warm and dry.      Capillary Refill: Capillary refill takes less than 2 seconds.      Findings: No lesion or rash.   Neurological:      Mental Status: She is alert and oriented to person, place, and time.      Cranial Nerves: Cranial nerves are intact.   Psychiatric:         Attention and Perception: Attention and perception normal.         Mood and Affect: Mood and affect normal.         Speech: Speech normal.         Behavior: Behavior normal. Behavior is cooperative.         Thought Content: Thought content normal.         Judgment: Judgment normal.       Assessment & Plan      1. Family history of MTHFR deficiency  - HOMOCYSTINE URINE    2. Vitamin D deficiency

## 2021-10-20 ENCOUNTER — OFFICE VISIT (OUTPATIENT)
Dept: MEDICAL GROUP | Facility: CLINIC | Age: 26
End: 2021-10-20
Payer: COMMERCIAL

## 2021-10-20 VITALS
TEMPERATURE: 99.2 F | DIASTOLIC BLOOD PRESSURE: 72 MMHG | OXYGEN SATURATION: 98 % | WEIGHT: 191.8 LBS | SYSTOLIC BLOOD PRESSURE: 122 MMHG | HEIGHT: 62 IN | RESPIRATION RATE: 16 BRPM | HEART RATE: 60 BPM | BODY MASS INDEX: 35.3 KG/M2

## 2021-10-20 DIAGNOSIS — E66.9 OBESITY (BMI 30-39.9): ICD-10-CM

## 2021-10-20 DIAGNOSIS — N96 HISTORY OF MULTIPLE SPONTANEOUS ABORTIONS: ICD-10-CM

## 2021-10-20 DIAGNOSIS — Z83.2 FAMILY HISTORY OF ANTIPHOSPHOLIPID SYNDROME: ICD-10-CM

## 2021-10-20 DIAGNOSIS — R63.5 UNEXPLAINED WEIGHT GAIN: ICD-10-CM

## 2021-10-20 DIAGNOSIS — Z83.49 FAMILY HISTORY OF MTHFR DEFICIENCY: ICD-10-CM

## 2021-10-20 DIAGNOSIS — R53.82 CHRONIC FATIGUE: ICD-10-CM

## 2021-10-20 PROCEDURE — 99214 OFFICE O/P EST MOD 30 MIN: CPT | Performed by: PHYSICIAN ASSISTANT

## 2021-10-20 ASSESSMENT — ENCOUNTER SYMPTOMS
WEIGHT LOSS: 0
RESPIRATORY NEGATIVE: 1
NEUROLOGICAL NEGATIVE: 1
EYES NEGATIVE: 1
DIAPHORESIS: 0
MUSCULOSKELETAL NEGATIVE: 1
CARDIOVASCULAR NEGATIVE: 1
PSYCHIATRIC NEGATIVE: 1
GASTROINTESTINAL NEGATIVE: 1
CHILLS: 0
FEVER: 0

## 2021-10-20 ASSESSMENT — FIBROSIS 4 INDEX: FIB4 SCORE: 0.41

## 2021-10-20 ASSESSMENT — VISUAL ACUITY: OU: 1

## 2021-10-21 ENCOUNTER — TELEMEDICINE (OUTPATIENT)
Dept: MEDICAL GROUP | Facility: CLINIC | Age: 26
End: 2021-10-21
Payer: COMMERCIAL

## 2021-10-21 VITALS — BODY MASS INDEX: 35.15 KG/M2 | HEIGHT: 62 IN | WEIGHT: 191 LBS

## 2021-10-21 DIAGNOSIS — F41.1 GENERALIZED ANXIETY DISORDER: ICD-10-CM

## 2021-10-21 PROCEDURE — 99213 OFFICE O/P EST LOW 20 MIN: CPT | Mod: 95,CR | Performed by: PHYSICIAN ASSISTANT

## 2021-10-21 ASSESSMENT — FIBROSIS 4 INDEX: FIB4 SCORE: 0.41

## 2021-10-21 NOTE — ASSESSMENT & PLAN NOTE
Patient having significant anxiety because her employer is mandating Covid vaccine.  I have explained to her that we cannot recommend that she not get the Covid vaccine and we cannot exempt her from it.  Her employer is requiring a form being filled out because they are contracted with the federal government.  I have outlined her anxiety in the paperwork.  I have explained to the patient that her employer has the right to exempt her or not and that will be up to them.  Patient understands that this paperwork and no way exempts her but does explain her condition to her employer.  Completed employer paperwork has been scanned to the chart.

## 2021-10-21 NOTE — PROGRESS NOTES
Subjective   Zaynab Galarza is a 26 y.o. female who presents with Requesting Labs    1. Family history of antiphospholipid syndrome  Patient here today to request lab work.  She has a strong family history MTHFR deficiency and her grandmother has antiphospholipid syndrome.  She has been encouraged by her family to have additional lab testing done.  Due to the family history, her history of multiple miscarriages and her elevated protein C and increased APTT time we will order additional lab work and follow-up with results.  - ANTICARDIOLIPIN AB IGG IGM; Future    2. Family history of MTHFR deficiency  Patient is still pending testing for MTHFR.  This testing is not covered by her insurance so she will be paying out-of-pocket.  She plans to have this testing done as soon as she can afford the lab work.  - ANTICARDIOLIPIN AB IGG IGM; Future    3. History of multiple spontaneous abortions  We will do testing for antiphospholipid syndrome and lupus as requested by the patient and indicated per family history and current medical conditions.  - ANTICARDIOLIPIN AB IGG IGM; Future  - LUPUS COMPREHENSIVE PANEL; Future    4. Chronic fatigue  - LUPUS COMPREHENSIVE PANEL; Future    5. Unexplained weight gain  - LUPUS COMPREHENSIVE PANEL; Future  - Patient identified as having weight management issue.  Appropriate orders and counseling given.    6. Obesity (BMI 30-39.9)  Body mass index is 35.08 kg/m². Patient has been diagnosed with obesity and again has been counseled on caloric and carbohydrate restriction along with increasing exercise to 30 minutes 5 or more times a week.  - Patient identified as having weight management issue.  Appropriate orders and counseling given.    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  Past Surgical History:  No date: CARPAL TUNNEL RELEASE; Left  No  date: DENTAL EXTRACTION(S)      Comment:  [place]  No date: TONSILLECTOMY  Social History    Tobacco Use      Smoking status: Never Smoker      Smokeless tobacco: Never Used    Vaping Use      Vaping Use: Former        Start date: 2/12/2018        Quit date: 5/31/2021        Substances: THC    Alcohol use: Yes      Alcohol/week: 0.6 oz      Types: 1 Glasses of wine per week      Comment: occ    Drug use: Not Currently      Types: Marijuana, Inhaled      Comment: vape pen quit may 21    Review of patient's family history indicates:  Problem: Other      Relation: Mother          Age of Onset: (Not Specified)          Comment: MTHFR  Problem: Arthritis      Relation: Mother          Age of Onset: (Not Specified)  Problem: Anemia      Relation: Mother          Age of Onset: (Not Specified)  Problem: Psychiatric Illness      Relation: Mother          Age of Onset: (Not Specified)          Comment: Bipolar  Problem: Diabetes      Relation: Father          Age of Onset: (Not Specified)  Problem: Other      Relation: Father          Age of Onset: (Not Specified)          Comment: MTHFR  Problem: Bipolar disorder      Relation: Sister          Age of Onset: (Not Specified)  Problem: Lupus      Relation: Sister          Age of Onset: (Not Specified)  Problem: Other      Relation: Sister          Age of Onset: (Not Specified)          Comment: MTHFR  Problem: No Known Problems      Relation: Brother          Age of Onset: (Not Specified)  Problem: Diabetes      Relation: Maternal Aunt          Age of Onset: (Not Specified)          Comment: ovarian  Problem: Diabetes      Relation: Paternal Grandmother          Age of Onset: (Not Specified)          Comment: breast  Problem: Cancer      Relation: Paternal Grandmother          Age of Onset: (Not Specified)          Comment: breast  Problem: Lung Disease      Relation: Paternal Grandmother          Age of Onset: (Not Specified)          Comment: COPD  Problem: Arthritis       "Relation: Maternal Grandmother          Age of Onset: (Not Specified)  Problem: Obesity      Relation: Maternal Grandmother          Age of Onset: (Not Specified)  Problem: Cancer      Relation: Paternal Grandfather          Age of Onset: (Not Specified)          Comment: lung      Current Outpatient Medications: •  montelukast (SINGULAIR) 10 MG Tab, Take 1 Tablet by mouth every day., Disp: 90 Tablet, Rfl: 1•  cetirizine (ZYRTEC) 10 MG Tab, Take 1 Tablet by mouth every day., Disp: 90 Tablet, Rfl: 1•  budesonide-formoterol (SYMBICORT) 80-4.5 MCG/ACT Aerosol, Inhale 2 Puffs 2 times a day., Disp: 10.2 g, Rfl: 3•  albuterol 108 (90 Base) MCG/ACT Aero Soln inhalation aerosol, Inhale 1-2 Puffs every four hours as needed for Shortness of Breath., Disp: 8 g, Rfl: 3•  albuterol (PROVENTIL) 2.5mg/3ml Nebu Soln solution for nebulization, Take 3 mL by nebulization every four hours as needed for Shortness of Breath., Disp: 540 mL, Rfl: 3    Patient was instructed on the use of medications, either prescriptions or OTC and informed on when the appropriate follow up time period should be. In addition, patient was also instructed that should any acute worsening occur that they should notify this clinic asap or call 911.      Review of Systems   Constitutional: Positive for malaise/fatigue. Negative for chills, diaphoresis, fever and weight loss.        Unintended weight gain   HENT: Negative.    Eyes: Negative.    Respiratory: Negative.    Cardiovascular: Negative.    Gastrointestinal: Negative.    Genitourinary: Negative.    Musculoskeletal: Negative.    Skin: Negative.    Neurological: Negative.    Endo/Heme/Allergies: Negative.    Psychiatric/Behavioral: Negative.      Objective     /72 (BP Location: Right arm, Patient Position: Sitting, BP Cuff Size: Adult)   Pulse 60   Temp 37.3 °C (99.2 °F) (Temporal)   Resp 16   Ht 1.575 m (5' 2\") Comment: stated by pt  Wt 87 kg (191 lb 12.8 oz) Comment: with shoes on  LMP " 10/19/2021 (Exact Date)   SpO2 98%   BMI 35.08 kg/m²      Physical Exam  Vitals and nursing note reviewed.   Constitutional:       Appearance: Normal appearance. She is well-developed and well-groomed.   HENT:      Head: Normocephalic and atraumatic.      Nose: Nose normal.      Mouth/Throat:      Lips: Pink. No lesions.      Mouth: Mucous membranes are moist.   Eyes:      General: Lids are normal. Vision grossly intact. Gaze aligned appropriately.      Extraocular Movements: Extraocular movements intact.      Conjunctiva/sclera: Conjunctivae normal.      Pupils: Pupils are equal, round, and reactive to light.   Neck:      Thyroid: No thyromegaly.      Vascular: No carotid bruit or JVD.      Trachea: Trachea and phonation normal.   Cardiovascular:      Rate and Rhythm: Normal rate and regular rhythm.      Heart sounds: Normal heart sounds. No murmur heard.   No friction rub. No gallop.    Pulmonary:      Effort: Pulmonary effort is normal.      Breath sounds: Normal breath sounds. No wheezing, rhonchi or rales.   Musculoskeletal:         General: Normal range of motion.      Cervical back: Normal range of motion and neck supple.      Right lower leg: No edema.      Left lower leg: No edema.   Lymphadenopathy:      Cervical: No cervical adenopathy.   Skin:     General: Skin is warm and dry.      Capillary Refill: Capillary refill takes less than 2 seconds.      Findings: No lesion or rash.   Neurological:      Mental Status: She is alert and oriented to person, place, and time.      Cranial Nerves: Cranial nerves are intact.   Psychiatric:         Attention and Perception: Attention and perception normal.         Mood and Affect: Mood and affect normal.         Speech: Speech normal.         Behavior: Behavior normal. Behavior is cooperative.         Thought Content: Thought content normal.         Judgment: Judgment normal.       Assessment & Plan      1. Family history of antiphospholipid syndrome  -  ANTICARDIOLIPIN AB IGG IGM; Future    2. Family history of MTHFR deficiency  - ANTICARDIOLIPIN AB IGG IGM; Future    3. History of multiple spontaneous abortions  - ANTICARDIOLIPIN AB IGG IGM; Future  - LUPUS COMPREHENSIVE PANEL; Future    4. Chronic fatigue  - LUPUS COMPREHENSIVE PANEL; Future    5. Unexplained weight gain  - LUPUS COMPREHENSIVE PANEL; Future  - Patient identified as having weight management issue.  Appropriate orders and counseling given.    6. Obesity (BMI 30-39.9)  - Patient identified as having weight management issue.  Appropriate orders and counseling given.

## 2021-10-21 NOTE — PROGRESS NOTES
Telemedicine Visit: Established Patient     This visit was conducted via Zoom using secure and encrypted videoconferencing technology. The patient was in a private location in the state of [place].    The patient's identity was confirmed and verbal consent was obtained for this virtual visit.    Subjective:   CC:   Chief Complaint   Patient presents with   • Paperwork     Zaynab Galarza is a 26 y.o. female presenting for evaluation and management of:    MONICA (generalized anxiety disorder)  Patient having significant anxiety because her employer is mandating Covid vaccine.  I have explained to her that we cannot recommend that she not get the Covid vaccine and we cannot exempt her from it.  Her employer is requiring a form being filled out because they are contracted with the federal government.  I have outlined her anxiety in the paperwork.  I have explained to the patient that her employer has the right to exempt her or not and that will be up to them.  Patient understands that this paperwork and no way exempts her but does explain her condition to her employer.  Completed employer paperwork has been scanned to the chart.      ROS   Positive for anxiety.  Denies any recent fevers or chills. No nausea or vomiting. No chest pains or shortness of breath.     Allergies   Allergen Reactions   • Cephalosporins Hives     Hives   • Penicillins Hives   • Zithromax [Azithromycin] Hives   • Zofran Odt Hives, Shortness of Breath and Itching       Current medicines (including changes today)  Current Outpatient Medications   Medication Sig Dispense Refill   • montelukast (SINGULAIR) 10 MG Tab Take 1 Tablet by mouth every day. 90 Tablet 1   • cetirizine (ZYRTEC) 10 MG Tab Take 1 Tablet by mouth every day. 90 Tablet 1   • budesonide-formoterol (SYMBICORT) 80-4.5 MCG/ACT Aerosol Inhale 2 Puffs 2 times a day. 10.2 g 3   • albuterol 108 (90 Base) MCG/ACT Aero Soln inhalation aerosol Inhale 1-2 Puffs every four hours as needed  for Shortness of Breath. 8 g 3   • albuterol (PROVENTIL) 2.5mg/3ml Nebu Soln solution for nebulization Take 3 mL by nebulization every four hours as needed for Shortness of Breath. 540 mL 3     No current facility-administered medications for this visit.       Patient Active Problem List    Diagnosis Date Noted   • Family history of antiphospholipid syndrome 10/20/2021   • History of multiple spontaneous abortions 10/20/2021   • Chronic fatigue 10/20/2021   • Family history of MTHFR deficiency 09/09/2021   • Vitamin D deficiency 09/09/2021   • Intolerance to cold 09/09/2021   • Night sweats 09/09/2021   • Hair loss 09/09/2021   • Routine physical examination 09/09/2021   • MDD (major depressive disorder), recurrent episode, moderate (HCC) 01/28/2020   • PTSD (post-traumatic stress disorder) 11/19/2019   • MONICA (generalized anxiety disorder) 11/19/2019   • Mild persistent asthma with acute exacerbation 10/09/2018   • Obesity (BMI 30-39.9) 09/10/2018       Family History   Problem Relation Age of Onset   • Other Mother         MTHFR   • Arthritis Mother    • Anemia Mother    • Psychiatric Illness Mother         Bipolar   • Diabetes Father    • Other Father         MTHFR   • Bipolar disorder Sister    • Lupus Sister    • Other Sister         MTHFR   • No Known Problems Brother    • Diabetes Maternal Aunt         ovarian   • Diabetes Paternal Grandmother         breast   • Cancer Paternal Grandmother         breast   • Lung Disease Paternal Grandmother         COPD   • Arthritis Maternal Grandmother    • Obesity Maternal Grandmother    • Cancer Paternal Grandfather         lung       She  has a past medical history of Allergy, Anxiety, Asthma, Depression, GERD (gastroesophageal reflux disease), and History of Kawasaki's disease (1999). She also has no past medical history of Anemia, Blood transfusion without reported diagnosis, Clotting disorder ([facility]), Diabetes ([facility]), Head ache, Heart attack ([facility]), Heart murmur,  "Hyperlipidemia, Hypertension, IBD (inflammatory bowel disease), Kidney disease, Migraine, Seizure (HCC), Substance abuse (HCC), or Thyroid disease.  She  has a past surgical history that includes tonsillectomy; dental extraction(s); and carpal tunnel release (Left).       Objective:   Ht 1.575 m (5' 2\") Comment: obtained from chart  Wt 86.6 kg (191 lb) Comment: obtained from chart  LMP 10/19/2021 (Exact Date)   BMI 34.93 kg/m²     Physical Exam:  Constitutional: Alert, no distress, well-groomed.  Skin: No rashes in visible areas.  Eye: Round. Conjunctiva clear, lids normal. No icterus.   ENMT: Lips pink without lesions, good dentition, moist mucous membranes. Phonation normal.  Neck: No masses, no thyromegaly. Moves freely without pain.  CV: Pulse as reported by patient  Respiratory: Unlabored respiratory effort, no cough or audible wheeze  Psych: Alert and oriented x3, normal affect and mood.       Assessment and Plan:   The following treatment plan was discussed:     1. MONICA (generalized anxiety disorder)        Follow-up: Return in about 27 days (around 11/17/2021) for f/u labs.           "

## 2021-10-26 LAB
APTT SCREEN TO CONFIRM RATIO: 0.98 RATIO (ref 0–1.4)
CARDIOLIPIN IGG SER IA-ACNC: 10 GPL U/ML (ref 0–14)
CARDIOLIPIN IGM SER IA-ACNC: 24 MPL U/ML (ref 0–12)
CONFIRM APTT/NORMAL: 32.7 SEC (ref 0–55)
LA 2 SCREEN W REFLEX-IMP: NORMAL
SCREEN APTT: 41.1 SEC (ref 0–51.9)
SCREEN DRVVT: 29.5 SEC (ref 0–47)
THROMBIN TIME: 18.3 SEC (ref 0–23)

## 2021-11-18 ENCOUNTER — OFFICE VISIT (OUTPATIENT)
Dept: MEDICAL GROUP | Facility: CLINIC | Age: 26
End: 2021-11-18
Payer: COMMERCIAL

## 2021-11-18 ENCOUNTER — HOSPITAL ENCOUNTER (OUTPATIENT)
Facility: MEDICAL CENTER | Age: 26
End: 2021-11-18
Attending: PHYSICIAN ASSISTANT
Payer: COMMERCIAL

## 2021-11-18 VITALS
HEART RATE: 60 BPM | BODY MASS INDEX: 34.41 KG/M2 | TEMPERATURE: 98.9 F | RESPIRATION RATE: 16 BRPM | DIASTOLIC BLOOD PRESSURE: 60 MMHG | HEIGHT: 62 IN | SYSTOLIC BLOOD PRESSURE: 110 MMHG | WEIGHT: 187 LBS | OXYGEN SATURATION: 98 %

## 2021-11-18 DIAGNOSIS — R31.9 URINARY TRACT INFECTION WITH HEMATURIA, SITE UNSPECIFIED: ICD-10-CM

## 2021-11-18 DIAGNOSIS — N39.0 URINARY TRACT INFECTION WITH HEMATURIA, SITE UNSPECIFIED: ICD-10-CM

## 2021-11-18 DIAGNOSIS — Z11.8 SCREENING FOR CHLAMYDIAL DISEASE: ICD-10-CM

## 2021-11-18 LAB
APPEARANCE UR: NORMAL
BILIRUB UR STRIP-MCNC: NEGATIVE MG/DL
COLOR UR AUTO: NORMAL
GLUCOSE UR STRIP.AUTO-MCNC: NEGATIVE MG/DL
KETONES UR STRIP.AUTO-MCNC: NEGATIVE MG/DL
LEUKOCYTE ESTERASE UR QL STRIP.AUTO: NEGATIVE
NITRITE UR QL STRIP.AUTO: NEGATIVE
PH UR STRIP.AUTO: 6.5 [PH] (ref 5–8)
PROT UR QL STRIP: NEGATIVE MG/DL
RBC UR QL AUTO: NORMAL
SP GR UR STRIP.AUTO: 1.03
UROBILINOGEN UR STRIP-MCNC: 0.2 MG/DL

## 2021-11-18 PROCEDURE — 87086 URINE CULTURE/COLONY COUNT: CPT

## 2021-11-18 PROCEDURE — 99214 OFFICE O/P EST MOD 30 MIN: CPT | Performed by: PHYSICIAN ASSISTANT

## 2021-11-18 PROCEDURE — 81002 URINALYSIS NONAUTO W/O SCOPE: CPT | Performed by: PHYSICIAN ASSISTANT

## 2021-11-18 PROCEDURE — 87077 CULTURE AEROBIC IDENTIFY: CPT

## 2021-11-18 RX ORDER — NITROFURANTOIN 25; 75 MG/1; MG/1
100 CAPSULE ORAL EVERY 12 HOURS
Qty: 10 CAPSULE | Refills: 0 | Status: SHIPPED | OUTPATIENT
Start: 2021-11-18 | End: 2021-11-23

## 2021-11-18 RX ORDER — PHENAZOPYRIDINE HYDROCHLORIDE 200 MG/1
200 TABLET, FILM COATED ORAL 3 TIMES DAILY
Qty: 6 TABLET | Refills: 0 | Status: SHIPPED | OUTPATIENT
Start: 2021-11-18 | End: 2021-11-20

## 2021-11-18 ASSESSMENT — FIBROSIS 4 INDEX: FIB4 SCORE: 0.41

## 2021-11-18 NOTE — PROGRESS NOTES
"  Chief Complaint   Patient presents with   • UTI     frequent urination, buring, x2 weeks        HPI:  Symptom onset: 14 days ago   Current symptoms: Painful, urgent, frequent voids. No blood noted in urine.  Since onset symptoms are: Unchanged  Treatments tried: OTC cranberry capsules.  Associated symptoms: Negative for fever, flank pain, nausea and vomiting, vaginal discharge, pelvic pain.  History is negative for frequent UTI.     ROS:  Denies fever, chills, vomiting or abdominal pain.     OBJECTIVE:  /60 (BP Location: Left arm, Patient Position: Sitting, BP Cuff Size: Large adult)   Pulse 60   Temp 37.2 °C (98.9 °F) (Temporal)   Resp 16   Ht 1.575 m (5' 2\")   Wt 84.8 kg (187 lb)   SpO2 98%   Gen: Alert, NAD.  Chest: Lungs clear to auscultation, CV RRR.  Abdomen: Soft, tender in suprapubic region. No CVAT. Normal bowel sounds.     Lab Results   Component Value Date    POCCOLOR dark yellow 11/18/2021    POCAPPEAR cloudy 11/18/2021    POCLEUKEST Negative 11/18/2021    POCNITRITE Negative 11/18/2021    POCUROBILIGE 0.2 11/18/2021    POCPROTEIN Negative 11/18/2021    POCURPH 6.5 11/18/2021    POCBLOOD Trace 11/18/2021    POCSPGRV 1.030 11/18/2021    POCKETONES Negative 11/18/2021    POCBILIRUBIN Negative 11/18/2021    POCGLUCUA Negative 11/18/2021          ASSESSMENT/PLAN:     1. Urinary tract infection with hematuria, site unspecified    2. Screening for chlamydial disease     Partner is also having similar symptoms. He has started using new grooming product in the pelvic area and she is concerned that they may be having an allergic reaction to the products. He has been instructed to stop using the product and see if this helps with symptoms. Will test for GC/Chlamydia for safety. Urine sent for culture. Will follow up with results.     1. Abnormal urine dipstick in office. Urine sent for culture. Start antibiotics.  2. Provided education to drink plenty of fluids, wipe front to back every void and " bowel movement.   3. Return to clinic if symptoms not improving within 3-4 days or in case of vomiting, fever, increasing pain.

## 2021-11-21 LAB
BACTERIA UR CULT: ABNORMAL
BACTERIA UR CULT: ABNORMAL
SIGNIFICANT IND 70042: ABNORMAL
SITE SITE: ABNORMAL
SOURCE SOURCE: ABNORMAL

## 2021-11-26 DIAGNOSIS — R82.71 GROUP B STREPTOCOCCAL BACTERIURIA: ICD-10-CM

## 2021-11-26 RX ORDER — LEVOFLOXACIN 250 MG/1
250 TABLET, FILM COATED ORAL DAILY
Qty: 3 TABLET | Refills: 0 | Status: SHIPPED | OUTPATIENT
Start: 2021-11-26 | End: 2022-03-02

## 2021-11-26 NOTE — PROGRESS NOTES
Urine culture returned positive for Group B streptococcus agalactiae.  Patient is allergic to penicillin and cephalosporins which are first line treatments.  Prescribed Levofloxacin 250 mg daily for 3 days to treat Group B cystitis.

## 2021-12-09 ENCOUNTER — TELEMEDICINE (OUTPATIENT)
Dept: MEDICAL GROUP | Facility: CLINIC | Age: 26
End: 2021-12-09
Payer: COMMERCIAL

## 2021-12-09 VITALS — WEIGHT: 187 LBS | BODY MASS INDEX: 34.41 KG/M2 | HEIGHT: 62 IN

## 2021-12-09 DIAGNOSIS — B34.9 VIRAL ILLNESS: ICD-10-CM

## 2021-12-09 PROCEDURE — 99212 OFFICE O/P EST SF 10 MIN: CPT | Mod: 95,CR | Performed by: PHYSICIAN ASSISTANT

## 2021-12-09 ASSESSMENT — FIBROSIS 4 INDEX: FIB4 SCORE: 0.41

## 2021-12-10 ENCOUNTER — PATIENT MESSAGE (OUTPATIENT)
Dept: MEDICAL GROUP | Facility: CLINIC | Age: 26
End: 2021-12-10

## 2021-12-10 NOTE — LETTER
December 13, 2021       Patient: Zaynab Galarza   YOB: 1995   Date of Visit: 12/10/2021         To Whom It May Concern:    In my medical opinion, I recommend that Zaynab Galarza return to work on 12- with no restrictions.    If you have any questions or concerns, please don't hesitate to call [phone]          Sincerely,          Tyesha Clemens P.A.-C.  Electronically Signed

## 2021-12-11 NOTE — PATIENT COMMUNICATION
VOICEMAIL  1. Caller Name: Zaynab Galarza                        Call Back Number: [phone] (home)       2. 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  Her temp was 97.4. She also sent a mychart message which I forwarded to Tyesha.     3. Patient approves office to leave a detailed voicemail/MyChart message: N\A

## 2022-03-02 ENCOUNTER — OFFICE VISIT (OUTPATIENT)
Dept: MEDICAL GROUP | Facility: CLINIC | Age: 27
End: 2022-03-02
Payer: COMMERCIAL

## 2022-03-02 VITALS
BODY MASS INDEX: 33.7 KG/M2 | TEMPERATURE: 97.5 F | DIASTOLIC BLOOD PRESSURE: 60 MMHG | SYSTOLIC BLOOD PRESSURE: 116 MMHG | RESPIRATION RATE: 16 BRPM | WEIGHT: 190.2 LBS | OXYGEN SATURATION: 97 % | HEIGHT: 63 IN | HEART RATE: 61 BPM

## 2022-03-02 DIAGNOSIS — S13.4XXA WHIPLASH INJURIES, INITIAL ENCOUNTER: ICD-10-CM

## 2022-03-02 PROCEDURE — 99213 OFFICE O/P EST LOW 20 MIN: CPT | Performed by: PHYSICIAN ASSISTANT

## 2022-03-02 RX ORDER — CYCLOBENZAPRINE HCL 5 MG
5 TABLET ORAL 3 TIMES DAILY PRN
Qty: 30 TABLET | Refills: 0 | Status: SHIPPED | OUTPATIENT
Start: 2022-03-02 | End: 2022-03-17

## 2022-03-02 RX ORDER — TRIAMCINOLONE ACETONIDE 55 UG/1
2 SPRAY, METERED NASAL DAILY
COMMUNITY
End: 2022-08-25

## 2022-03-02 RX ORDER — NAPROXEN 500 MG/1
500 TABLET ORAL 2 TIMES DAILY WITH MEALS
Qty: 60 TABLET | Refills: 1 | Status: SHIPPED | OUTPATIENT
Start: 2022-03-02 | End: 2022-03-17

## 2022-03-02 ASSESSMENT — PATIENT HEALTH QUESTIONNAIRE - PHQ9: CLINICAL INTERPRETATION OF PHQ2 SCORE: 0

## 2022-03-02 ASSESSMENT — FIBROSIS 4 INDEX: FIB4 SCORE: 0.41

## 2022-03-02 NOTE — LETTER
March 2, 2022       Patient: Zaynab Galarza   YOB: 1995   Date of Visit: 3/2/2022         To Whom It May Concern:    In my medical opinion, I recommend that Zaynab Galarza remain out of work from 02/28/2020 until 03/03/2022 return to light duty on 03/04/2022 with the following restrictions no lifting more than 10 lbs. and the ability to sit and rest if needed. She can return to work with no restrictions starting on 03/17/2022.    If you have any questions or concerns, please don't hesitate to call [phone]          Sincerely,          Regla Montano P.A.-C.  Electronically Signed

## 2022-03-09 PROBLEM — J45.909 REACTIVE AIRWAY DISEASE: Status: ACTIVE | Noted: 2022-03-09

## 2022-03-09 PROBLEM — Z87.39 HISTORY OF KAWASAKI'S DISEASE: Status: ACTIVE | Noted: 2022-03-09

## 2022-03-09 ASSESSMENT — ENCOUNTER SYMPTOMS
NECK PAIN: 1
BACK PAIN: 1
NEUROLOGICAL NEGATIVE: 1
CONSTITUTIONAL NEGATIVE: 1
EYES NEGATIVE: 1
PSYCHIATRIC NEGATIVE: 1
RESPIRATORY NEGATIVE: 1
FALLS: 0
CARDIOVASCULAR NEGATIVE: 1
GASTROINTESTINAL NEGATIVE: 1
MYALGIAS: 0

## 2022-03-09 ASSESSMENT — VISUAL ACUITY: OU: 1
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
## 2022-03-17 ENCOUNTER — OFFICE VISIT (OUTPATIENT)
Dept: MEDICAL GROUP | Facility: CLINIC | Age: 27
End: 2022-03-17
Payer: COMMERCIAL

## 2022-03-17 VITALS
OXYGEN SATURATION: 94 % | TEMPERATURE: 97.8 F | HEART RATE: 78 BPM | SYSTOLIC BLOOD PRESSURE: 106 MMHG | BODY MASS INDEX: 33.66 KG/M2 | WEIGHT: 190 LBS | RESPIRATION RATE: 16 BRPM | HEIGHT: 63 IN | DIASTOLIC BLOOD PRESSURE: 60 MMHG

## 2022-03-17 DIAGNOSIS — S13.4XXD WHIPLASH INJURIES, SUBSEQUENT ENCOUNTER: ICD-10-CM

## 2022-03-17 DIAGNOSIS — K52.9 CHRONIC DIARRHEA OF UNKNOWN ORIGIN: ICD-10-CM

## 2022-03-17 DIAGNOSIS — J45.31 MILD PERSISTENT ASTHMA WITH ACUTE EXACERBATION: ICD-10-CM

## 2022-03-17 DIAGNOSIS — R09.81 CHRONIC NASAL CONGESTION: ICD-10-CM

## 2022-03-17 PROBLEM — B34.9 VIRAL ILLNESS: Status: RESOLVED | Noted: 2021-12-09 | Resolved: 2022-03-17

## 2022-03-17 PROBLEM — Z00.00 ROUTINE PHYSICAL EXAMINATION: Status: RESOLVED | Noted: 2021-09-09 | Resolved: 2022-03-17

## 2022-03-17 PROBLEM — J45.909 REACTIVE AIRWAY DISEASE: Status: RESOLVED | Noted: 2022-03-09 | Resolved: 2022-03-17

## 2022-03-17 PROCEDURE — 99214 OFFICE O/P EST MOD 30 MIN: CPT | Performed by: PHYSICIAN ASSISTANT

## 2022-03-17 ASSESSMENT — ENCOUNTER SYMPTOMS
NEUROLOGICAL NEGATIVE: 1
CONSTITUTIONAL NEGATIVE: 1
HEARTBURN: 0
COUGH: 0
PSYCHIATRIC NEGATIVE: 1
VOMITING: 0
DIARRHEA: 1
CARDIOVASCULAR NEGATIVE: 1
SPUTUM PRODUCTION: 0
SINUS PAIN: 0
BLOOD IN STOOL: 0
CONSTIPATION: 0
SORE THROAT: 0
NAUSEA: 0
STRIDOR: 0
HEMOPTYSIS: 0
MUSCULOSKELETAL NEGATIVE: 1
ABDOMINAL PAIN: 1
WHEEZING: 1
SHORTNESS OF BREATH: 1
EYES NEGATIVE: 1

## 2022-03-17 ASSESSMENT — FIBROSIS 4 INDEX: FIB4 SCORE: 0.41

## 2022-03-17 ASSESSMENT — VISUAL ACUITY: OU: 1
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
## 2022-04-05 ENCOUNTER — OFFICE VISIT (OUTPATIENT)
Dept: MEDICAL GROUP | Facility: CLINIC | Age: 27
End: 2022-04-05
Payer: COMMERCIAL

## 2022-04-05 DIAGNOSIS — R09.81 CHRONIC NASAL CONGESTION: ICD-10-CM

## 2022-04-05 DIAGNOSIS — J30.89 ENVIRONMENTAL AND SEASONAL ALLERGIES: ICD-10-CM

## 2022-04-05 DIAGNOSIS — J45.31 MILD PERSISTENT ASTHMA WITH ACUTE EXACERBATION: ICD-10-CM

## 2022-04-05 PROCEDURE — 99213 OFFICE O/P EST LOW 20 MIN: CPT | Performed by: PHYSICIAN ASSISTANT

## 2022-04-05 ASSESSMENT — FIBROSIS 4 INDEX: FIB4 SCORE: 0.41

## 2022-04-05 NOTE — LETTER
April 5, 2022       Patient: Zaynab Galarza   YOB: 1995   Date of Visit: 4/5/2022         To Whom It May Concern:    In my medical opinion, I recommend that Zaynab Galarza remain out of work 04/04/2022 until 04/05/2022. She may return to work on 04/06/2022..    If you have any questions or concerns, please don't hesitate to call [phone]          Sincerely,          Regla Montano P.A.-C.  Electronically Signed

## 2022-04-08 DIAGNOSIS — J01.90 ACUTE BACTERIAL RHINOSINUSITIS: ICD-10-CM

## 2022-04-08 DIAGNOSIS — B96.89 ACUTE BACTERIAL RHINOSINUSITIS: ICD-10-CM

## 2022-04-08 RX ORDER — DOXYCYCLINE HYCLATE 100 MG
100 TABLET ORAL 2 TIMES DAILY
Qty: 14 TABLET | Refills: 0 | Status: SHIPPED | OUTPATIENT
Start: 2022-04-08 | End: 2022-06-07

## 2022-04-21 VITALS
HEIGHT: 63 IN | RESPIRATION RATE: 14 BRPM | TEMPERATURE: 98.9 F | WEIGHT: 191.8 LBS | BODY MASS INDEX: 33.98 KG/M2 | HEART RATE: 61 BPM | DIASTOLIC BLOOD PRESSURE: 74 MMHG | OXYGEN SATURATION: 98 % | SYSTOLIC BLOOD PRESSURE: 122 MMHG

## 2022-04-21 ASSESSMENT — ENCOUNTER SYMPTOMS
GASTROINTESTINAL NEGATIVE: 1
SHORTNESS OF BREATH: 1
SINUS PAIN: 0
STRIDOR: 0
SPUTUM PRODUCTION: 0
NEUROLOGICAL NEGATIVE: 1
COUGH: 0
PHOTOPHOBIA: 0
BLURRED VISION: 0
SORE THROAT: 0
CONSTITUTIONAL NEGATIVE: 1
CARDIOVASCULAR NEGATIVE: 1
WHEEZING: 1
HEMOPTYSIS: 0
MUSCULOSKELETAL NEGATIVE: 1
DOUBLE VISION: 0
EYE DISCHARGE: 0
PSYCHIATRIC NEGATIVE: 1
EYE PAIN: 0
EYE REDNESS: 1

## 2022-04-21 ASSESSMENT — VISUAL ACUITY: OU: 1

## 2022-04-21 NOTE — PROGRESS NOTES
Subjective   Zaynab Galarza is a 26 y.o. female who presents with Eye Problem (Pt states right eye has blurriness and dry eyes started on sunday) and Seasonal Allergies (Pt state has nasal congestion started sunday)    1. Chronic nasal congestion  She has chronic congestion that has become worse over the last week. She has also started to have dry, itchy, watery eyes as well. She is on 3 medications for her allergies but these are not currently controlling her symptoms. We have reviewed some tips to make her house more allergy friendly as well as recommending changing clothes and showering when she comes in from being outside.   Referral to Allergy    2. Mild persistent asthma with acute exacerbation   Her asthma symptoms are getting worse. Using Symbicort, montelukast, albuterol inhaler and albuterol nebulizer when needed. States she is using rescue inhaler no more than once a day.   Referral to Allergy    3. Environmental and seasonal allergies  Patient has year around allergies. States that allergy symptoms have become significantly worse over the last month despite being on multiple allergy medications. Taking cetirizine, montelukast and triamcinolone nasal spray.  Patient also has moderate persistent asthma that requires symbicort and albuterol to control. Gets 5-6 sinus infections a year and 3-4 ear infections that require treatment. Wants a referral to allergy for further evaluation and treatment options.   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  3/9/2022: Reactive airway disease  9/9/2021: Routine physical examination  Past Surgical History:  No date: CARPAL TUNNEL RELEASE; Left  No date: DENTAL EXTRACTION(S)      Comment:  [place]  No date: TONSILLECTOMY  Social History    Tobacco Use      Smoking status: Never Smoker      Smokeless  tobacco: Never Used    Vaping Use      Vaping Use: Former        Start date: 2/12/2018        Quit date: 5/31/2021    Alcohol use: Yes      Alcohol/week: 0.6 oz      Types: 1 Glasses of wine per week      Comment: occ    Drug use: Not Currently      Types: Marijuana, Inhaled      Comment: vape pen quit may 21    Review of patient's family history indicates:  Problem: Other      Relation: Mother          Age of Onset: (Not Specified)          Comment: MTHFR  Problem: Arthritis      Relation: Mother          Age of Onset: (Not Specified)  Problem: Anemia      Relation: Mother          Age of Onset: (Not Specified)  Problem: Psychiatric Illness      Relation: Mother          Age of Onset: (Not Specified)          Comment: Bipolar  Problem: Diabetes      Relation: Father          Age of Onset: (Not Specified)  Problem: Other      Relation: Father          Age of Onset: (Not Specified)          Comment: MTHFR  Problem: Bipolar disorder      Relation: Sister          Age of Onset: (Not Specified)  Problem: Lupus      Relation: Sister          Age of Onset: (Not Specified)  Problem: Other      Relation: Sister          Age of Onset: (Not Specified)          Comment: MTHFR  Problem: No Known Problems      Relation: Brother          Age of Onset: (Not Specified)  Problem: Diabetes      Relation: Maternal Aunt          Age of Onset: (Not Specified)          Comment: ovarian  Problem: Diabetes      Relation: Paternal Grandmother          Age of Onset: (Not Specified)          Comment: breast  Problem: Cancer      Relation: Paternal Grandmother          Age of Onset: (Not Specified)          Comment: breast  Problem: Lung Disease      Relation: Paternal Grandmother          Age of Onset: (Not Specified)          Comment: COPD  Problem: Arthritis      Relation: Maternal Grandmother          Age of Onset: (Not Specified)  Problem: Obesity      Relation: Maternal Grandmother          Age of Onset: (Not Specified)  Problem:  Cancer      Relation: Paternal Grandfather          Age of Onset: (Not Specified)          Comment: lung      Current Outpatient Medications: •  triamcinolone (NASACORT) 55 MCG/ACT nasal inhaler, Administer 2 Sprays into affected nostril(S) every day., Disp: , Rfl: •  montelukast (SINGULAIR) 10 MG Tab, Take 1 Tablet by mouth every day., Disp: 90 Tablet, Rfl: 1•  albuterol 108 (90 Base) MCG/ACT Aero Soln inhalation aerosol, Inhale 1-2 Puffs every four hours as needed for Shortness of Breath., Disp: 8 g, Rfl: 5•  albuterol (PROVENTIL) 2.5mg/3ml Nebu Soln solution for nebulization, Take 3 mL by nebulization every four hours as needed for Shortness of Breath., Disp: 540 mL, Rfl: 1•  budesonide-formoterol (SYMBICORT) 160-4.5 MCG/ACT Aerosol, Inhale 1 Puff 2 times a day., Disp: 10.2 g, Rfl: 3•  cetirizine (ZYRTEC) 10 MG Tab, Take 1 Tablet by mouth every day., Disp: 90 Tablet, Rfl: 1•  doxycycline (VIBRAMYCIN) 100 MG Tab, Take 1 Tablet by mouth 2 times a day., Disp: 14 Tablet, Rfl: 0    Patient was instructed on the use of medications, either prescriptions or OTC and informed on when the appropriate follow up time period should be. In addition, patient was also instructed that should any acute worsening occur that they should notify this clinic asap or call 911.      Review of Systems   Constitutional: Negative.    HENT: Positive for congestion. Negative for ear discharge, ear pain, hearing loss, nosebleeds, sinus pain, sore throat and tinnitus.    Eyes: Positive for redness. Negative for blurred vision, double vision, photophobia, pain and discharge.        Dry and itching eyes   Respiratory: Positive for shortness of breath and wheezing. Negative for cough, hemoptysis, sputum production and stridor.    Cardiovascular: Negative.    Gastrointestinal: Negative.    Genitourinary: Negative.    Musculoskeletal: Negative.    Skin: Negative.    Neurological: Negative.    Endo/Heme/Allergies: Negative.    Psychiatric/Behavioral:  "Negative.      Objective     /74 (BP Location: Left arm, Patient Position: Sitting, BP Cuff Size: Adult long)   Pulse 61   Temp 37.2 °C (98.9 °F) (Temporal)   Resp 14   Ht 1.6 m (5' 3\")   Wt 87 kg (191 lb 12.8 oz)   SpO2 98%   BMI 33.98 kg/m²      Physical Exam  Vitals and nursing note reviewed.   Constitutional:       General: She is not in acute distress.     Appearance: Normal appearance. She is well-developed and well-groomed. She is obese. She is not ill-appearing.   HENT:      Head: Normocephalic and atraumatic.      Nose: Congestion and rhinorrhea present. Rhinorrhea is clear.      Right Turbinates: Swollen and pale.      Left Turbinates: Swollen and pale.      Mouth/Throat:      Lips: Pink. No lesions.      Mouth: Mucous membranes are moist.   Eyes:      General: Lids are normal. Vision grossly intact. Gaze aligned appropriately.      Extraocular Movements: Extraocular movements intact.      Conjunctiva/sclera:      Right eye: Right conjunctiva is injected.      Left eye: Left conjunctiva is injected.      Pupils: Pupils are equal, round, and reactive to light.   Neck:      Thyroid: No thyromegaly.      Vascular: No carotid bruit or JVD.      Trachea: Trachea and phonation normal.   Cardiovascular:      Rate and Rhythm: Normal rate and regular rhythm.      Heart sounds: Normal heart sounds. No murmur heard.    No friction rub. No gallop.   Pulmonary:      Effort: Pulmonary effort is normal.      Breath sounds: Examination of the right-middle field reveals wheezing. Examination of the left-middle field reveals wheezing. Examination of the right-lower field reveals wheezing. Examination of the left-lower field reveals wheezing. Wheezing present. No decreased breath sounds, rhonchi or rales.   Musculoskeletal:         General: Normal range of motion.      Cervical back: Normal range of motion and neck supple.      Right lower leg: No edema.      Left lower leg: No edema.   Lymphadenopathy:      " Cervical: No cervical adenopathy.   Skin:     General: Skin is warm and dry.      Capillary Refill: Capillary refill takes less than 2 seconds.      Findings: No lesion or rash.   Neurological:      Mental Status: She is alert and oriented to person, place, and time.      Cranial Nerves: Cranial nerves are intact.   Psychiatric:         Attention and Perception: Attention and perception normal.         Mood and Affect: Mood and affect normal.         Speech: Speech normal.         Behavior: Behavior normal. Behavior is cooperative.         Thought Content: Thought content normal.         Judgment: Judgment normal.       Assessment & Plan      1. Chronic nasal congestion  - Referral to Allergy    2. Mild persistent asthma with acute exacerbation  - Referral to Allergy    3. Environmental and seasonal allergies  - Referral to Allergy

## 2022-06-07 ENCOUNTER — APPOINTMENT (OUTPATIENT)
Dept: RADIOLOGY | Facility: IMAGING CENTER | Age: 27
End: 2022-06-07
Attending: PHYSICIAN ASSISTANT
Payer: COMMERCIAL

## 2022-06-07 ENCOUNTER — OFFICE VISIT (OUTPATIENT)
Dept: MEDICAL GROUP | Facility: CLINIC | Age: 27
End: 2022-06-07
Payer: COMMERCIAL

## 2022-06-07 ENCOUNTER — NON-PROVIDER VISIT (OUTPATIENT)
Dept: URGENT CARE | Facility: PHYSICIAN GROUP | Age: 27
End: 2022-06-07
Payer: COMMERCIAL

## 2022-06-07 VITALS
HEIGHT: 64 IN | DIASTOLIC BLOOD PRESSURE: 70 MMHG | WEIGHT: 193.2 LBS | HEART RATE: 72 BPM | BODY MASS INDEX: 32.98 KG/M2 | SYSTOLIC BLOOD PRESSURE: 100 MMHG | TEMPERATURE: 98 F | RESPIRATION RATE: 20 BRPM | OXYGEN SATURATION: 95 %

## 2022-06-07 DIAGNOSIS — S99.912A INJURY OF LEFT ANKLE, INITIAL ENCOUNTER: ICD-10-CM

## 2022-06-07 PROCEDURE — 73620 X-RAY EXAM OF FOOT: CPT | Mod: TC,FY,LT | Performed by: FAMILY MEDICINE

## 2022-06-07 PROCEDURE — 73600 X-RAY EXAM OF ANKLE: CPT | Mod: TC,FY,LT | Performed by: FAMILY MEDICINE

## 2022-06-07 PROCEDURE — 99213 OFFICE O/P EST LOW 20 MIN: CPT | Performed by: PHYSICIAN ASSISTANT

## 2022-06-07 RX ORDER — DOXYCYCLINE HYCLATE 100 MG/1
CAPSULE ORAL
COMMUNITY
Start: 2022-04-08 | End: 2022-06-07

## 2022-06-07 ASSESSMENT — FIBROSIS 4 INDEX: FIB4 SCORE: 0.41

## 2022-06-07 NOTE — ASSESSMENT & PLAN NOTE
Rolled ankle. Now having pain around 5 th metatarsal. Having increasing pain and swelling on top of foot and at ball and arch. States if she walks too much she also get pain in the lateral heel.  States that her foot feels unstable and wants to roll again so she has to walk very carefully to prevent rolling it again. Will get xrays to rule out fracture. Recommend elastic sleeve to support foot and ankle until results are received. Depending on the xray will likely place referral to PT or orthopedics.

## 2022-06-07 NOTE — LETTER
June 7, 2022       Patient: Zaynab Galarza   YOB: 1995   Date of Visit: 6/7/2022         To Whom It May Concern:    Zaynab Galarza was seen in my clinic today. She may return to work on 06/08/2022.    If you have any questions or concerns, please don't hesitate to call [phone].          Sincerely,          Regla Montano P.A.-C.  Electronically Signed
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
## 2022-08-25 ENCOUNTER — HOSPITAL ENCOUNTER (OUTPATIENT)
Dept: RADIOLOGY | Facility: MEDICAL CENTER | Age: 27
End: 2022-08-25
Attending: PHYSICIAN ASSISTANT
Payer: COMMERCIAL

## 2022-08-25 ENCOUNTER — OFFICE VISIT (OUTPATIENT)
Dept: MEDICAL GROUP | Facility: CLINIC | Age: 27
End: 2022-08-25
Payer: COMMERCIAL

## 2022-08-25 ENCOUNTER — HOSPITAL ENCOUNTER (OUTPATIENT)
Facility: MEDICAL CENTER | Age: 27
End: 2022-08-25
Attending: PHYSICIAN ASSISTANT
Payer: COMMERCIAL

## 2022-08-25 VITALS
SYSTOLIC BLOOD PRESSURE: 122 MMHG | RESPIRATION RATE: 20 BRPM | OXYGEN SATURATION: 98 % | BODY MASS INDEX: 34.34 KG/M2 | TEMPERATURE: 98.5 F | DIASTOLIC BLOOD PRESSURE: 78 MMHG | HEART RATE: 70 BPM | WEIGHT: 193.8 LBS | HEIGHT: 63 IN

## 2022-08-25 DIAGNOSIS — N93.9 ABNORMAL UTERINE AND VAGINAL BLEEDING, UNSPECIFIED: ICD-10-CM

## 2022-08-25 DIAGNOSIS — E66.9 OBESITY (BMI 30-39.9): ICD-10-CM

## 2022-08-25 DIAGNOSIS — R11.2 NAUSEA AND VOMITING, INTRACTABILITY OF VOMITING NOT SPECIFIED, UNSPECIFIED VOMITING TYPE: ICD-10-CM

## 2022-08-25 DIAGNOSIS — R10.30 LOWER ABDOMINAL PAIN: ICD-10-CM

## 2022-08-25 LAB
APPEARANCE UR: CLEAR
BILIRUB UR STRIP-MCNC: NORMAL MG/DL
COLOR UR AUTO: YELLOW
GLUCOSE UR STRIP.AUTO-MCNC: NORMAL MG/DL
INT CON NEG: NEGATIVE
INT CON POS: POSITIVE
KETONES UR STRIP.AUTO-MCNC: NORMAL MG/DL
LEUKOCYTE ESTERASE UR QL STRIP.AUTO: NORMAL
NITRITE UR QL STRIP.AUTO: NORMAL
PH UR STRIP.AUTO: 5.5 [PH] (ref 5–8)
POC URINE PREGNANCY TEST: NEGATIVE
PROT UR QL STRIP: NORMAL MG/DL
RBC UR QL AUTO: NORMAL
SP GR UR STRIP.AUTO: 1.02
UROBILINOGEN UR STRIP-MCNC: 0.2 MG/DL

## 2022-08-25 PROCEDURE — 85025 COMPLETE CBC W/AUTO DIFF WBC: CPT

## 2022-08-25 PROCEDURE — 87660 TRICHOMONAS VAGIN DIR PROBE: CPT

## 2022-08-25 PROCEDURE — 81002 URINALYSIS NONAUTO W/O SCOPE: CPT | Performed by: PHYSICIAN ASSISTANT

## 2022-08-25 PROCEDURE — 81025 URINE PREGNANCY TEST: CPT | Performed by: PHYSICIAN ASSISTANT

## 2022-08-25 PROCEDURE — 80048 BASIC METABOLIC PNL TOTAL CA: CPT

## 2022-08-25 PROCEDURE — 87491 CHLMYD TRACH DNA AMP PROBE: CPT

## 2022-08-25 PROCEDURE — 87510 GARDNER VAG DNA DIR PROBE: CPT

## 2022-08-25 PROCEDURE — 99214 OFFICE O/P EST MOD 30 MIN: CPT | Performed by: PHYSICIAN ASSISTANT

## 2022-08-25 PROCEDURE — 87480 CANDIDA DNA DIR PROBE: CPT

## 2022-08-25 PROCEDURE — 76830 TRANSVAGINAL US NON-OB: CPT

## 2022-08-25 PROCEDURE — 87591 N.GONORRHOEAE DNA AMP PROB: CPT

## 2022-08-25 RX ORDER — PROMETHAZINE HYDROCHLORIDE 25 MG/1
25 TABLET ORAL EVERY 6 HOURS PRN
Qty: 30 TABLET | Refills: 0 | Status: SHIPPED | OUTPATIENT
Start: 2022-08-25 | End: 2023-02-01

## 2022-08-25 ASSESSMENT — FIBROSIS 4 INDEX: FIB4 SCORE: 0.43
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
## 2022-08-25 NOTE — LETTER
August 25, 2022       Patient: Zaynab Galarza   YOB: 1995   Date of Visit: 8/25/2022         To Whom It May Concern:    In my medical opinion, I recommend that Zaynab Galarza remain out of work from 08/22/22 until 08/26/22. She may return to work on 08/29/22.    If you have any questions or concerns, please don't hesitate to call [phone]          Sincerely,          Regla Montano P.A.-C.  Electronically Signed

## 2022-08-25 NOTE — ASSESSMENT & PLAN NOTE
Heavy bleeding and passing clots for 3 days. Not normal menstrual flow. Severe abdominal pain, nausea and vomiting. Urine pregnancy testing is negative. Stat pelvic US ordered.

## 2022-08-25 NOTE — ASSESSMENT & PLAN NOTE
Has been having nausea, vomiting, chills and sweats for the last 3 days as well. Has been having very heavy bleeding for the last 3 days as well and passing clots. She states that the last time she had these symptoms she had a uterine infection. Ordered GC/Chlamydia, vaginal pathogens, CBC, CMP and pelvic US. Will follow up in one week to review results. Strict ER precautions if symptoms get worse. Patient allergic to ondansetron so given promethazine for nausea and vomiting.

## 2022-08-25 NOTE — ASSESSMENT & PLAN NOTE
Presents with lower abdominal pain for the last 3 days. Worse on left side but is across entire lower abdomen. Abdominal exam shows normal bowel sounds with TTP over all abdominal quadrants but worse in RLQ with palpation. No rebound pain. Pain around umbilicus and LLQ also. Urine pregnancy testing is negative. Order stat pelvic US and will follow up with results.

## 2022-08-26 LAB
ANION GAP SERPL CALC-SCNC: 11 MMOL/L (ref 7–16)
BASOPHILS # BLD AUTO: 1.3 % (ref 0–1.8)
BASOPHILS # BLD: 0.09 K/UL (ref 0–0.12)
BUN SERPL-MCNC: 12 MG/DL (ref 8–22)
C TRACH DNA SPEC QL NAA+PROBE: NEGATIVE
CALCIUM SERPL-MCNC: 9 MG/DL (ref 8.5–10.5)
CANDIDA DNA VAG QL PROBE+SIG AMP: NEGATIVE
CHLORIDE SERPL-SCNC: 105 MMOL/L (ref 96–112)
CO2 SERPL-SCNC: 21 MMOL/L (ref 20–33)
CREAT SERPL-MCNC: 0.54 MG/DL (ref 0.5–1.4)
EOSINOPHIL # BLD AUTO: 0.15 K/UL (ref 0–0.51)
EOSINOPHIL NFR BLD: 2.2 % (ref 0–6.9)
ERYTHROCYTE [DISTWIDTH] IN BLOOD BY AUTOMATED COUNT: 42 FL (ref 35.9–50)
G VAGINALIS DNA VAG QL PROBE+SIG AMP: NEGATIVE
GFR SERPLBLD CREATININE-BSD FMLA CKD-EPI: 129 ML/MIN/1.73 M 2
GLUCOSE SERPL-MCNC: 93 MG/DL (ref 65–99)
HCT VFR BLD AUTO: 42.3 % (ref 37–47)
HGB BLD-MCNC: 14.5 G/DL (ref 12–16)
IMM GRANULOCYTES # BLD AUTO: 0.01 K/UL (ref 0–0.11)
IMM GRANULOCYTES NFR BLD AUTO: 0.1 % (ref 0–0.9)
LYMPHOCYTES # BLD AUTO: 2.34 K/UL (ref 1–4.8)
LYMPHOCYTES NFR BLD: 34.3 % (ref 22–41)
MCH RBC QN AUTO: 31.7 PG (ref 27–33)
MCHC RBC AUTO-ENTMCNC: 34.3 G/DL (ref 33.6–35)
MCV RBC AUTO: 92.4 FL (ref 81.4–97.8)
MONOCYTES # BLD AUTO: 0.5 K/UL (ref 0–0.85)
MONOCYTES NFR BLD AUTO: 7.3 % (ref 0–13.4)
N GONORRHOEA DNA SPEC QL NAA+PROBE: NEGATIVE
NEUTROPHILS # BLD AUTO: 3.73 K/UL (ref 2–7.15)
NEUTROPHILS NFR BLD: 54.8 % (ref 44–72)
NRBC # BLD AUTO: 0 K/UL
NRBC BLD-RTO: 0 /100 WBC
PLATELET # BLD AUTO: 247 K/UL (ref 164–446)
PMV BLD AUTO: 10.7 FL (ref 9–12.9)
POTASSIUM SERPL-SCNC: 4.4 MMOL/L (ref 3.6–5.5)
RBC # BLD AUTO: 4.58 M/UL (ref 4.2–5.4)
SODIUM SERPL-SCNC: 137 MMOL/L (ref 135–145)
SPECIMEN SOURCE: NORMAL
T VAGINALIS DNA VAG QL PROBE+SIG AMP: NEGATIVE
WBC # BLD AUTO: 6.8 K/UL (ref 4.8–10.8)

## 2022-08-31 ENCOUNTER — OFFICE VISIT (OUTPATIENT)
Dept: MEDICAL GROUP | Facility: CLINIC | Age: 27
End: 2022-08-31
Payer: COMMERCIAL

## 2022-08-31 VITALS
WEIGHT: 193.2 LBS | RESPIRATION RATE: 20 BRPM | BODY MASS INDEX: 34.23 KG/M2 | TEMPERATURE: 98.4 F | HEART RATE: 73 BPM | DIASTOLIC BLOOD PRESSURE: 70 MMHG | SYSTOLIC BLOOD PRESSURE: 113 MMHG | OXYGEN SATURATION: 95 % | HEIGHT: 63 IN

## 2022-08-31 DIAGNOSIS — N93.9 ABNORMAL UTERINE AND VAGINAL BLEEDING, UNSPECIFIED: ICD-10-CM

## 2022-08-31 DIAGNOSIS — R11.2 NAUSEA AND VOMITING, INTRACTABILITY OF VOMITING NOT SPECIFIED, UNSPECIFIED VOMITING TYPE: ICD-10-CM

## 2022-08-31 PROCEDURE — 99213 OFFICE O/P EST LOW 20 MIN: CPT | Performed by: PHYSICIAN ASSISTANT

## 2022-08-31 ASSESSMENT — FIBROSIS 4 INDEX: FIB4 SCORE: 0.47

## 2022-08-31 NOTE — ASSESSMENT & PLAN NOTE
Pelvic US shows Heterogeneous myometrial echotexture with irregular bands of shadowing and echogenic foci. Findings are suggestive of adenomyomatosis. Patient no longer bleeding and passing clots. Episode lasted 8-9 days. She is established with a GYN and should follow up for further evaluation.

## 2022-08-31 NOTE — ASSESSMENT & PLAN NOTE
Patient states that she finally stopped vomiting yesterday. Had  About 4 days of constipation that changed to diarrhea for 2 days. This morning her BM was normal. Likely a viral gastroenteritis. Feeling much better today.
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
## 2023-02-01 ENCOUNTER — OCCUPATIONAL MEDICINE (OUTPATIENT)
Dept: URGENT CARE | Facility: PHYSICIAN GROUP | Age: 28
End: 2023-02-01
Payer: COMMERCIAL

## 2023-02-01 VITALS
RESPIRATION RATE: 16 BRPM | OXYGEN SATURATION: 99 % | DIASTOLIC BLOOD PRESSURE: 70 MMHG | SYSTOLIC BLOOD PRESSURE: 122 MMHG | TEMPERATURE: 97.2 F | HEIGHT: 62 IN | BODY MASS INDEX: 36.29 KG/M2 | HEART RATE: 75 BPM | WEIGHT: 197.2 LBS

## 2023-02-01 DIAGNOSIS — R21 RASH AND NONSPECIFIC SKIN ERUPTION: ICD-10-CM

## 2023-02-01 PROCEDURE — 99203 OFFICE O/P NEW LOW 30 MIN: CPT | Performed by: NURSE PRACTITIONER

## 2023-02-01 RX ORDER — PREDNISONE 20 MG/1
TABLET ORAL
Qty: 10 TABLET | Refills: 0 | Status: SHIPPED | OUTPATIENT
Start: 2023-02-01 | End: 2023-02-28

## 2023-02-01 ASSESSMENT — ENCOUNTER SYMPTOMS
SENSORY CHANGE: 0
MYALGIAS: 0
TINGLING: 0
CHILLS: 0
FEVER: 0

## 2023-02-01 ASSESSMENT — FIBROSIS 4 INDEX: FIB4 SCORE: 0.47

## 2023-02-01 NOTE — LETTER
[facility] [place] [place] [address]  Phone:  [phone] - Fax:  [phone]   [facility] Progress Report and Disability Certification  Date of Service: 2/1/2023   No Show:  No  Date / Time of Next Visit: 2/4/2023   Claim Information   Patient Name: Zaynab Galarza  Claim Number:     Employer: [employer] *** Date of Injury:      Insurer / TPA: [employer] *** ID / SSN:     Occupation:  *** Diagnosis: The encounter diagnosis was Rash and nonspecific skin eruption.    Medical Information   Related to Industrial Injury?   Comments:uncertain ***   Subjective Complaints:  DOI: 2/1/23. Patient is 27 year old female with rash to upper torso and face. 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 The rash is itchy. She denies use of new skin products at home but does work with chemicals at work. States many allergies but never had a rash like this. No medications taken. No history of skin rashes previously.   Objective Findings: Physical Exam  Vitals and nursing note reviewed.   Constitutional:       Appearance: Normal appearance.   Cardiovascular:      Rate and Rhythm: Normal rate and regular rhythm.      Heart sounds: No murmur heard.  Pulmonary:      Effort: Pulmonary effort is normal.      Breath sounds: Normal breath sounds.   Musculoskeletal:         General: Normal range of motion.   Skin:     General: Skin is warm and dry.      Findings: Rash present.      Comments: Macular red rash noted on face, neck and upper back/chest area.    Neurological:      General: No focal deficit present.      Mental Status: She is alert and oriented to person, place, and time.   Psychiatric:         Mood and Affect: Mood normal.         Behavior: Behavior normal.      Pre-Existing Condition(s):     Assessment:   Initial Visit    Status: Additional Care Required  Permanent Disability:No    Plan: Medication    Diagnostics:      Comments:       Disability  Information   Status: Released to Restricted Duty    From:  2/1/2023  Through: 2/4/2023 Restrictions are: Temporary   Physical Restrictions   Sitting:    Standing:    Stooping:    Bending:      Squatting:    Walking:    Climbing:    Pushing:      Pulling:    Other:    Reaching Above Shoulder (L):   Reaching Above Shoulder (R):       Reaching Below Shoulder (L):    Reaching Below Shoulder (R):      Not to exceed Weight Limits   Carrying(hrs):   Weight Limit(lb):   Lifting(hrs):   Weight  Limit(lb):     Comments: Patient not to be around chemicals.     Repetitive Actions   Hands: i.e. Fine Manipulations from Grasping:     Feet: i.e. Operating Foot Controls:     Driving / Operate Machinery:     Health Care Provider’s Original or Electronic Signature  Diogenes Martin AGudeliaPGudeliaN. Health Care Provider’s Original or Electronic Signature    Julian Kim DO MPH     Clinic Name / Location: [address] [address] Clinic Phone Number: Dept: [phone]   Appointment Time: 6:45 Pm Visit Start Time:    Check-In Time:   Visit Discharge Time:  ***   Original-Treating Physician or Chiropractor    Page 2-Insurer/TPA    Page 3-Employer    Page 4-Employee

## 2023-02-01 NOTE — LETTER
"EMPLOYEE’S CLAIM FOR COMPENSATION/ REPORT OF INITIAL TREATMENT  FORM C-4    EMPLOYEE’S CLAIM - PROVIDE ALL INFORMATION REQUESTED   First Name  Zaynab Last Name  Geovanni Birthdate                    1995                Sex  female Claim Number (Insurer’s Use Only)    Home Address  [address] Age  27 y.o. Height  1.575 m (5' 2\") Weight  89.4 kg (197 lb 3.2 oz) [facility]     [facility] Zip  [address] Telephone  [phone] (home)    Mailing Address  [address] [facility] Zip  [address] Primary Language Spoken  English    Insurer   Third-Party   [employer]   Employee's Occupation (Job Title) When Injury or Occupational Disease Occurred      Employer's Name/Company Name  [employer]  Telephone  [phone]    Office Mail Address (Number and Street)   [address]  Zip  [address]    Date of Injury                 Hours Injury   Date Employer Notified   Last Day of Work after Injury     or Occupational Disease   Supervisor to Whom Injury     Reported     Address or Location of Accident (if applicable)     What were you doing at the time of accident? (if applicable)      How did this injury or occupational disease occur? (Be specific an answer in detail. Use additional sheet if necessary)     If you believe that you have an occupational disease, when did you first have knowledge of the disability and it relationship to your employment?   Witnesses to the Accident        Nature of Injury or Occupational Disease    Part(s) of Body Injured or Affected  , ,     I certify that the above is true and correct to the best of my knowledge and that I have provided this information in order to obtain the benefits of [place] of [place]  I hereby authorize any physician, chiropractor, " surgeon, practitioner, or other person, any hospital, including [facility] or [facility], any medical service organization, any insurance company, or other institution or organization to release to each other, any medical or other information, including benefits paid or payable, pertinent to this injury or disease, except information relative to diagnosis, treatment and/or counseling for AIDS, psychological conditions, alcohol or controlled substances, for which I must give specific authorization.  A Photostat of this authorization shall be as valid as the original.     Date 02/01/2023    [facility] Employee’s Original or  *Electronic Signature   THIS REPORT MUST BE COMPLETED AND MAILED WITHIN 3 WORKING DAYS OF TREATMENT   Place  [facility]  Name of Facility  [facility]   Date  2/1/2023 Diagnosis and Description of Injury or Occupational Disease  (R21) Rash and nonspecific skin eruption Is there evidence the injured employee was under the influence of alcohol and/or another controlled substance at the time of accident?  ? No ? Yes (if yes, please explain)    Hour     The encounter diagnosis was Rash and nonspecific skin eruption. No   Treatment  Prednisone and benadryl. No benadryl while at work. May trial calamine lotion.  Have you advised the patient to remain off work five days or     more?    X-Ray Findings      ? Yes Indicate dates:   From   To      From information given by the employee, together with medical evidence, can        you directly connect this injury or occupational disease as job incurred?  Yes ? No If no, is the injured employee capable of:  ? full duty  No ? modified duty  Yes   Is additional medical care by a physician indicated?  Yes If Modified Duty, Specify any Limitations / Restrictions  Per D39   Do you know of any previous injury or disease contributing to this condition or occupational disease?  ? Yes ? No (Explain if yes)                  "         No   Date  2/1/2023 Print Health Care Provider's   TREVA Mendiola. I certify the employer’s copy of  this form was mailed on:   Address  [address] Insurer’s Use Only     [place] Zip  [address]    Provider’s Tax ID Number  [id number] Telephone  Dept: [phone]             Health Care Provider’s Original or Electronic Signature  e-MADELIN Gonzalez Degree (MD,DO, DC,PAKitC,APRN)         * Complete and attach Release of Information (Form C-4A) when injured employee signs C-4 Form electronically  ORIGINAL - TREATING HEALTHCARE PROVIDER PAGE 2 - INSURER/TPA PAGE 3 - EMPLOYER PAGE 4 - EMPLOYEE             Form C-4 (rev.08/21)           BRIEF DESCRIPTION OF RIGHTS AND BENEFITS  (Pursuant to [place]RS 616C.050)    Notice of Injury or Occupational Disease (Incident Report Form C-1): If an injury or occupational disease (OD) arises out of and in the course of employment, you must provide written notice to your employer as soon as practicable, but no later than 7 days after the accident or OD. Your employer shall maintain a sufficient supply of the required forms.    Claim for Compensation (Form C-4): If medical treatment is sought, the form C-4 is available at the place of initial treatment. A completed \"Claim for Compensation\" (Form C-4) must be filed within 90 days after an accident or OD. The treating physician or chiropractor must, within 3 working days after treatment, complete and mail to the employer, the employer's insurer and third-party , the Claim for Compensation.    Medical Treatment: If you require medical treatment for your on-the-job injury or OD, you may be required to select a physician or chiropractor from a list provided by your workers’ compensation insurer, if it has contracted with an Organization for Managed Care (MCO) or Preferred Provider Organization (PPO) or providers of health care. If your employer has not entered into a contract with " an MCO or PPO, you may select a physician or chiropractor from the Panel of Physicians and Chiropractors. Any medical costs related to your industrial injury or OD will be paid by your insurer.    Temporary Total Disability (TTD): If your doctor has certified that you are unable to work for a period of at least 5 consecutive days, or 5 cumulative days in a 20-day period, or places restrictions on you that your employer does not accommodate, you may be entitled to TTD compensation.    Temporary Partial Disability (TPD): If the wage you receive upon reemployment is less than the compensation for TTD to which you are entitled, the insurer may be required to pay you TPD compensation to make up the difference. TPD can only be paid for a maximum of 24 months.    Permanent Partial Disability (PPD): When your medical condition is stable and there is an indication of a PPD as a result of your injury or OD, within 30 days, your insurer must arrange for an evaluation by a rating physician or chiropractor to determine the degree of your PPD. The amount of your PPD award depends on the date of injury, the results of the PPD evaluation, your age and wage.    Permanent Total Disability (PTD): If you are medically certified by a treating physician or chiropractor as permanently and totally disabled and have been granted a PTD status by your insurer, you are entitled to receive monthly benefits not to exceed 66 2/3% of your average monthly wage. The amount of your PTD payments is subject to reduction if you previously received a lump-sum PPD award.    Vocational Rehabilitation Services: You may be eligible for vocational rehabilitation services if you are unable to return to the job due to a permanent physical impairment or permanent restrictions as a result of your injury or occupational disease.    Transportation and Per Joanne Reimbursement: You may be eligible for travel expenses and per joanne associated with medical  treatment.    Reopening: You may be able to reopen your claim if your condition worsens after claim closure.     Appeal Process: If you disagree with a written determination issued by the insurer or the insurer does not respond to your request, you may appeal to the Department of Administration, , by following the instructions contained in your determination letter. You must appeal the determination within 70 days from the date of the determination letter at [address], [address], [place], [place] [address], or [address], [address], [place], [place] [address]. If you disagree with the  decision, you may appeal to the Department of Administration, . You must file your appeal within 30 days from the date of the  decision letter at [address], [address], [place], [place] [address], or [address], [address], [place], [place] [address]. If you disagree with a decision of an , you may file a petition for judicial review with the District Court. You must do so within 30 days of the Appeal Officer’s decision. You may be represented by an  at your own expense or you may contact the [facility] for possible representation.    [place]  for Injured Workers (NAIW): If you disagree with a  decision, you may request that NAIW represent you without charge at an  Hearing. For information regarding denial of benefits, you may contact the [facility] at: [address], [address], [place] [address], [phone], or [address], [address], [place] [address], [phone]    To File a Complaint with the Division: If you wish to file a complaint with the  of the Division of Industrial Relations (DIR),  please contact the Workers’ Compensation Section, [address], [address], [place], [place] [address], telephone [phone], or [address], [address], [place], [place] [address], telephone [phone].    For assistance with Workers’ Compensation Issues: You may contact the [facility] Office for Consumer Health Assistance, [address], [address], [place], [place] [address], Toll Free 1-320.710.8665, Web site: http://[facility].[place].gov/Programs/ANAYELI E-mail: anayeli@[email or web address].[email or web address].[facility]              __________________________________________________________________                                    02/01/2023            Employee Name / Signature                                                                                                                            Date                                                                                                                                                                                                                              D-2 (rev. 10/20)

## 2023-02-01 NOTE — LETTER
[address] [place] [address]  Phone:  [phone] - Fax:  [phone]   [facility] Progress Report and Disability Certification  Date of Service: 2/1/2023   No Show:  No  Date / Time of Next Visit: 2/4/2023   Claim Information   Patient Name: Zaynab Galarza  Claim Number:     Employer: [employer]  Date of Injury:      Insurer / TPA: Dede  ID / SSN:     Occupation:   Diagnosis: The encounter diagnosis was Rash and nonspecific skin eruption.    Medical Information   Related to Industrial Injury?   Comments:uncertain    Subjective Complaints:  DOI: 2/1/23. Patient is 27 year old female with rash to upper torso and face. 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 The rash is itchy. She denies use of new skin products at home but does work with chemicals at work. States many allergies but never had a rash like this. No medications taken. No history of skin rashes previously.   Objective Findings: Physical Exam  Vitals and nursing note reviewed.   Constitutional:       Appearance: Normal appearance.   Cardiovascular:      Rate and Rhythm: Normal rate and regular rhythm.      Heart sounds: No murmur heard.  Pulmonary:      Effort: Pulmonary effort is normal.      Breath sounds: Normal breath sounds.   Musculoskeletal:         General: Normal range of motion.   Skin:     General: Skin is warm and dry.      Findings: Rash present.      Comments: Macular red rash noted on face, neck and upper back/chest area.    Neurological:      General: No focal deficit present.      Mental Status: She is alert and oriented to person, place, and time.   Psychiatric:         Mood and Affect: Mood normal.         Behavior: Behavior normal.      Pre-Existing Condition(s):     Assessment:   Initial Visit    Status: Additional Care Required  Permanent Disability:No    Plan: Medication    Diagnostics:      Comments:       Disability Information    Status: Released to Restricted Duty    From:  2/1/2023  Through: 2/4/2023 Restrictions are: Temporary   Physical Restrictions   Sitting:    Standing:    Stooping:    Bending:      Squatting:    Walking:    Climbing:    Pushing:      Pulling:    Other:    Reaching Above Shoulder (L):   Reaching Above Shoulder (R):       Reaching Below Shoulder (L):    Reaching Below Shoulder (R):      Not to exceed Weight Limits   Carrying(hrs):   Weight Limit(lb):   Lifting(hrs):   Weight  Limit(lb):     Comments: Patient not to be around chemicals.     Repetitive Actions   Hands: i.e. Fine Manipulations from Grasping:     Feet: i.e. Operating Foot Controls:     Driving / Operate Machinery:     Health Care Provider’s Original or Electronic Signature  Diogenes Martin A.P.N. Health Care Provider’s Original or Electronic Signature    Julian Kim DO MPH     Clinic Name / Location: [address] [address] Clinic Phone Number: Dept: [phone]   Appointment Time: 6:45 Pm Visit Start Time:    Check-In Time:   Visit Discharge Time:  7:34 PM   Original-Treating Physician or Chiropractor    Page 2-Insurer/TPA    Page 3-Employer    Page 4-Employee

## 2023-02-02 NOTE — PROGRESS NOTES
Subjective     Zaynab Galarza is a 27 y.o. female who presents with Rash (Noticed today, pt states she feel welts. Pt states  the rash is all over her neck and upper chest )            HPIDOI: 2/1/23. Patient is 27 year old female with rash to upper torso and face. 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 The rash is itchy. She denies use of new skin products at home but does work with chemicals at work. States many allergies but never had a rash like this. No medications taken. No history of skin rashes previously.    Cephalosporins, Penicillins, Zithromax [azithromycin], Zofran odt, and Sulfa drugs  Current Outpatient Medications on File Prior to Visit   Medication Sig Dispense Refill    albuterol 108 (90 Base) MCG/ACT Aero Soln inhalation aerosol Inhale 1-2 Puffs every four hours as needed for Shortness of Breath. 8 g 5    albuterol (PROVENTIL) 2.5mg/3ml Nebu Soln solution for nebulization Take 3 mL by nebulization every four hours as needed for Shortness of Breath. 540 mL 1     No current facility-administered medications on file prior to visit.     Social History     Socioeconomic History    Marital status: Single     Spouse name: Not on file    Number of children: 0    Years of education: Not on file    Highest education level: Some college, no degree   Occupational History    Not on file   Tobacco Use    Smoking status: Never    Smokeless tobacco: Never   Vaping Use    Vaping Use: Former    Start date: 2/12/2018    Quit date: 5/31/2021   Substance and Sexual Activity    Alcohol use: Not Currently     Alcohol/week: 0.6 oz     Types: 1 Glasses of wine per week     Comment: occ    Drug use: Not Currently     Types: Marijuana, Inhaled     Comment: occ    Sexual activity: Yes     Partners: Male     Birth control/protection: Condom   Other Topics Concern    Not on file   Social History Narrative    Not on file     Social Determinants of Health     Financial Resource Strain: Not on file   Food  "Insecurity: Not on file   Transportation Needs: Not on file   Physical Activity: Not on file   Stress: Not on file   Social Connections: Not on file   Intimate Partner Violence: Not on file   Housing Stability: Not on file     Breast Cancer-related family history is not on file.      Review of Systems   Constitutional:  Negative for chills and fever.   Musculoskeletal:  Negative for joint pain and myalgias.   Skin:  Positive for itching and rash.   Neurological:  Negative for tingling and sensory change.            Objective     /70   Pulse 75   Temp 36.2 °C (97.2 °F) (Temporal)   Resp 16   Ht 1.575 m (5' 2\")   Wt 89.4 kg (197 lb 3.2 oz)   SpO2 99%   BMI 36.07 kg/m²      Physical Exam  Vitals and nursing note reviewed.   Constitutional:       Appearance: Normal appearance.   Cardiovascular:      Rate and Rhythm: Normal rate and regular rhythm.      Heart sounds: No murmur heard.  Pulmonary:      Effort: Pulmonary effort is normal.      Breath sounds: Normal breath sounds.   Musculoskeletal:         General: Normal range of motion.   Skin:     General: Skin is warm and dry.      Findings: Rash present.      Comments: Macular red rash noted on face, neck and upper back/chest area.    Neurological:      General: No focal deficit present.      Mental Status: She is alert and oriented to person, place, and time.   Psychiatric:         Mood and Affect: Mood normal.         Behavior: Behavior normal.                           Assessment & Plan        1. Rash and nonspecific skin eruption  predniSONE (DELTASONE) 20 MG Tab        Work related  Benadryl and prednisone.  No benadryl while at work, may consider claritin.  Follow up Saturday.                  "

## 2023-02-04 ENCOUNTER — OCCUPATIONAL MEDICINE (OUTPATIENT)
Dept: URGENT CARE | Facility: PHYSICIAN GROUP | Age: 28
End: 2023-02-04
Payer: COMMERCIAL

## 2023-02-04 VITALS
SYSTOLIC BLOOD PRESSURE: 112 MMHG | OXYGEN SATURATION: 97 % | TEMPERATURE: 98.4 F | HEART RATE: 67 BPM | DIASTOLIC BLOOD PRESSURE: 70 MMHG | BODY MASS INDEX: 36.25 KG/M2 | HEIGHT: 62 IN | RESPIRATION RATE: 16 BRPM | WEIGHT: 197 LBS

## 2023-02-04 DIAGNOSIS — R21 RASH AND NONSPECIFIC SKIN ERUPTION: ICD-10-CM

## 2023-02-04 PROCEDURE — 99213 OFFICE O/P EST LOW 20 MIN: CPT | Performed by: NURSE PRACTITIONER

## 2023-02-04 RX ORDER — NORETHINDRONE ACETATE AND ETHINYL ESTRADIOL AND FERROUS FUMARATE 1.5-30(21)
KIT ORAL
COMMUNITY
Start: 2022-12-16

## 2023-02-04 ASSESSMENT — FIBROSIS 4 INDEX: FIB4 SCORE: 0.47

## 2023-02-04 ASSESSMENT — ENCOUNTER SYMPTOMS
SHORTNESS OF BREATH: 0
VOMITING: 0
CHILLS: 0
SORE THROAT: 0
MYALGIAS: 0
NAUSEA: 0
FEVER: 0
EYE PAIN: 0
DIZZINESS: 0

## 2023-02-04 NOTE — LETTER
"[address] [place] [address]  Phone:  [phone] - Fax:  [phone]   [facility] Progress Report and Disability Certification  Date of Service: 2/4/2023   No Show:  No  Date / Time of Next Visit:     Claim Information   Patient Name: Zaynab Galarza  Claim Number:     Employer: [employer]  Date of Injury: 2/1/2023     Insurer / TPA: [employer]  ID / SSN:     Occupation: .  Diagnosis: The encounter diagnosis was Rash and nonspecific skin eruption.    Medical Information   Related to Industrial Injury? Yes    Subjective Complaints:  DOI: 2/1/23. \"Patient is 27 year old female with rash to upper torso and face. 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  Has diffuse dry patches.  Suspects it was due to an acid at work.      Objective Findings: Skin: Diffuse dry scabbed lesions on chest and back.  No urticarial rash, no edema, no vesicular lesions, no crusting, no erythema.  No angioedema   Pre-Existing Condition(s):     Assessment:   Condition Improved    Status: Discharged /  MMI  Permanent Disability:No    Plan:      Diagnostics:      Comments:       Disability Information   Status: Released to Full Duty    From:  2/4/2023  Through:   Restrictions are:     Physical Restrictions   Sitting:    Standing:    Stooping:    Bending:      Squatting:    Walking:    Climbing:    Pushing:      Pulling:    Other:    Reaching Above Shoulder (L):   Reaching Above Shoulder (R):       Reaching Below Shoulder (L):    Reaching Below Shoulder (R):      Not to exceed Weight Limits   Carrying(hrs):   Weight Limit(lb):   Lifting(hrs):   Weight  Limit(lb):     Comments: Patient will be released to full duty without restrictions and discharged MMI    Repetitive Actions   Hands: i.e. Fine Manipulations from " Grasping:     Feet: i.e. Operating Foot Controls:     Driving / Operate Machinery:     Health Care Provider’s Original or Electronic Signature  JARVIS Lombardo Health Care Provider’s Original or Electronic Signature    Julian Kim DO MPH     Clinic Name / Location: [address] [address] Clinic Phone Number: Dept: [phone]   Appointment Time: 1:00 Pm Visit Start Time: 1:55 PM   Check-In Time:  12:40 Pm Visit Discharge Time:  2:26 PM   Original-Treating Physician or Chiropractor    Page 2-Insurer/TPA    Page 3-Employer    Page 4-Employee

## 2023-02-04 NOTE — PROGRESS NOTES
"Subjective:   Zaynab Galarza  is a 27 y.o. female who presents for Work-Related Injury ( fv )    DOI: 2/1/23. \"Patient is 27 year old female with rash to upper torso and face. 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  Has diffuse dry patches.  Suspects it was due to an acid at work.      HPI  Review of Systems   Constitutional:  Negative for chills and fever.   HENT:  Negative for sore throat.    Eyes:  Negative for pain.   Respiratory:  Negative for shortness of breath.    Cardiovascular:  Negative for chest pain.   Gastrointestinal:  Negative for nausea and vomiting.   Genitourinary:  Negative for hematuria.   Musculoskeletal:  Negative for myalgias.   Skin:  Negative for itching and rash.   Neurological:  Negative for dizziness.   Allergies   Allergen Reactions    Cephalosporins Hives     anaphylactic      Penicillins Hives    Zithromax [Azithromycin] Hives    Zofran Odt Hives, Shortness of Breath and Itching    Sulfa Drugs       Objective:   /70   Pulse 67   Temp 36.9 °C (98.4 °F) (Temporal)   Resp 16   Ht 1.575 m (5' 2\")   Wt 89.4 kg (197 lb)   SpO2 97%   BMI 36.03 kg/m²   Physical Exam  Constitutional:       Appearance: Normal appearance. She is not ill-appearing or toxic-appearing.   HENT:      Head: Normocephalic.      Right Ear: External ear normal.      Left Ear: External ear normal.      Nose: Nose normal.      Mouth/Throat:      Lips: Pink.      Mouth: Mucous membranes are moist.   Eyes:      General: Lids are normal.         Right eye: No discharge.         Left eye: No discharge.   Pulmonary:      Effort: Pulmonary effort is normal. No accessory muscle usage or respiratory distress.   Musculoskeletal:         General: Normal range of motion.      Cervical back: Full passive range of motion without pain.   Skin:     Coloration: Skin is not pale.      " Findings: No rash. Rash is not crusting, urticarial or vesicular.   Neurological:      Mental Status: She is alert and oriented to person, place, and time.   Psychiatric:         Mood and Affect: Mood normal.         Thought Content: Thought content normal.     Skin: Diffuse dry scabbed lesions on chest and back.  No urticarial rash, no edema, no vesicular lesions, no crusting, no erythema.  No angioedema   Assessment/Plan:     1. Rash and nonspecific skin eruption          Patient will be released to full duty without restrictions and discharged MMI

## 2023-02-28 ENCOUNTER — OFFICE VISIT (OUTPATIENT)
Dept: MEDICAL GROUP | Facility: CLINIC | Age: 28
End: 2023-02-28
Payer: COMMERCIAL

## 2023-02-28 VITALS
HEART RATE: 70 BPM | DIASTOLIC BLOOD PRESSURE: 80 MMHG | RESPIRATION RATE: 16 BRPM | OXYGEN SATURATION: 98 % | TEMPERATURE: 98.1 F | SYSTOLIC BLOOD PRESSURE: 102 MMHG | BODY MASS INDEX: 36.44 KG/M2 | WEIGHT: 198 LBS | HEIGHT: 62 IN

## 2023-02-28 DIAGNOSIS — K92.1 BLOOD IN STOOL: ICD-10-CM

## 2023-02-28 DIAGNOSIS — R22.31 MASS OF FINGER OF RIGHT HAND: ICD-10-CM

## 2023-02-28 PROCEDURE — 99213 OFFICE O/P EST LOW 20 MIN: CPT | Performed by: PHYSICIAN ASSISTANT

## 2023-02-28 ASSESSMENT — PATIENT HEALTH QUESTIONNAIRE - PHQ9
3. TROUBLE FALLING OR STAYING ASLEEP OR SLEEPING TOO MUCH: NOT AT ALL
4. FEELING TIRED OR HAVING LITTLE ENERGY: NOT AT ALL
6. FEELING BAD ABOUT YOURSELF - OR THAT YOU ARE A FAILURE OR HAVE LET YOURSELF OR YOUR FAMILY DOWN: NOT AL ALL
7. TROUBLE CONCENTRATING ON THINGS, SUCH AS READING THE NEWSPAPER OR WATCHING TELEVISION: NOT AT ALL
9. THOUGHTS THAT YOU WOULD BE BETTER OFF DEAD, OR OF HURTING YOURSELF: NOT AT ALL
1. LITTLE INTEREST OR PLEASURE IN DOING THINGS: NOT AT ALL
SUM OF ALL RESPONSES TO PHQ9 QUESTIONS 1 AND 2: 0
5. POOR APPETITE OR OVEREATING: NOT AT ALL
8. MOVING OR SPEAKING SO SLOWLY THAT OTHER PEOPLE COULD HAVE NOTICED. OR THE OPPOSITE, BEING SO FIGETY OR RESTLESS THAT YOU HAVE BEEN MOVING AROUND A LOT MORE THAN USUAL: NOT AT ALL
2. FEELING DOWN, DEPRESSED, IRRITABLE, OR HOPELESS: NOT AT ALL
SUM OF ALL RESPONSES TO PHQ QUESTIONS 1-9: 0

## 2023-02-28 ASSESSMENT — FIBROSIS 4 INDEX: FIB4 SCORE: 0.47

## 2023-02-28 NOTE — ASSESSMENT & PLAN NOTE
Patient has a painful mass at the palmar base of her right middle finger. Mass is firm and non-mobile. Will get xray to see if there is a bone spur at the area. Will follow up with results.

## 2023-02-28 NOTE — ASSESSMENT & PLAN NOTE
Patient had one episode of radha blood with her stool. She states that this has happened once before and went away. She had other bowel movements yesterday with blood smear on stool. Normal stool this morning. Patient denies straining to have BM or constipation. States that she did have some abdominal pain before the bloody bowel movement and gas. After the BM her discomfort was less. Discomfort has resolved this morning. Likely internal hemorrhoids causing the bleeding. She will watch her stool and if blood reappears we will send referral to GI for further evaluation.
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
## 2023-03-16 ENCOUNTER — OFFICE VISIT (OUTPATIENT)
Dept: MEDICAL GROUP | Facility: CLINIC | Age: 28
End: 2023-03-16
Payer: COMMERCIAL

## 2023-03-16 VITALS
DIASTOLIC BLOOD PRESSURE: 78 MMHG | WEIGHT: 201.8 LBS | SYSTOLIC BLOOD PRESSURE: 102 MMHG | TEMPERATURE: 97.9 F | RESPIRATION RATE: 16 BRPM | OXYGEN SATURATION: 99 % | HEART RATE: 70 BPM | BODY MASS INDEX: 35.75 KG/M2 | HEIGHT: 63 IN

## 2023-03-16 DIAGNOSIS — K52.9 CHRONIC DIARRHEA OF UNKNOWN ORIGIN: ICD-10-CM

## 2023-03-16 DIAGNOSIS — Z11.59 ENCOUNTER FOR HEPATITIS C SCREENING TEST FOR LOW RISK PATIENT: ICD-10-CM

## 2023-03-16 DIAGNOSIS — K92.1 BLOOD IN STOOL: ICD-10-CM

## 2023-03-16 DIAGNOSIS — Z11.4 SCREENING FOR HIV WITHOUT PRESENCE OF RISK FACTORS: ICD-10-CM

## 2023-03-16 PROCEDURE — 99213 OFFICE O/P EST LOW 20 MIN: CPT | Performed by: PHYSICIAN ASSISTANT

## 2023-03-16 ASSESSMENT — FIBROSIS 4 INDEX: FIB4 SCORE: 0.47
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
## 2023-03-16 NOTE — ASSESSMENT & PLAN NOTE
Patient has a history of chronic, intermittent diarrhea. She states that she self diagnosed gluten and lactose intolerance. She has issues with stomach pain with digestive issues, abdominal pain, bloating, diarrhea (sometimes with blood in the stool). She was seen by a GI about 4 years ago and never followed through with follow up so no official diagnosis. Needs to be evaluated. Referral placed to GI for further evaluation and testing. She sill follow up after she sees GI.

## 2023-03-27 ENCOUNTER — OFFICE VISIT (OUTPATIENT)
Dept: MEDICAL GROUP | Facility: CLINIC | Age: 28
End: 2023-03-27
Payer: COMMERCIAL

## 2023-03-27 VITALS
TEMPERATURE: 98.1 F | RESPIRATION RATE: 16 BRPM | SYSTOLIC BLOOD PRESSURE: 102 MMHG | HEART RATE: 65 BPM | HEIGHT: 63 IN | BODY MASS INDEX: 35.97 KG/M2 | DIASTOLIC BLOOD PRESSURE: 78 MMHG | OXYGEN SATURATION: 97 % | WEIGHT: 203 LBS

## 2023-03-27 DIAGNOSIS — J02.9 SORE THROAT: ICD-10-CM

## 2023-03-27 DIAGNOSIS — J00 ACUTE NASOPHARYNGITIS: ICD-10-CM

## 2023-03-27 LAB — S PYO DNA SPEC NAA+PROBE: NOT DETECTED

## 2023-03-27 PROCEDURE — 99213 OFFICE O/P EST LOW 20 MIN: CPT | Performed by: PHYSICIAN ASSISTANT

## 2023-03-27 PROCEDURE — 87651 STREP A DNA AMP PROBE: CPT | Performed by: PHYSICIAN ASSISTANT

## 2023-03-27 RX ORDER — DOXYCYCLINE 100 MG/1
100 CAPSULE ORAL 2 TIMES DAILY
Qty: 10 CAPSULE | Refills: 0 | Status: SHIPPED | OUTPATIENT
Start: 2023-03-27 | End: 2023-04-01

## 2023-03-27 ASSESSMENT — FIBROSIS 4 INDEX: FIB4 SCORE: 0.47

## 2023-03-27 NOTE — PROGRESS NOTES
"Chief Complaint   Patient presents with    Cold Symptoms     Sore throat, hard to swallow 5 days   Negative at home Covid test        HPI: Patient is a 27 y.o. female complaining of 6 days of illness including: facial pain, nasal congestion, rhinorrhea, sore throat.   Mucus is: green.  Similarly ill exposures: no.  Treatments tried: Dayquil/Nyquil  She  reports that she has never smoked. She has never used smokeless tobacco..     ROS:  No fever, cough, nausea, changes in bowel movements or skin rash.      I reviewed the patient's medications, allergies and medical history:  Current Outpatient Medications   Medication Sig Dispense Refill    doxycycline (MONODOX) 100 MG capsule Take 1 Capsule by mouth 2 times a day for 5 days. 10 Capsule 0    [facility] 1.5/30 1.5-30 MG-MCG tablet       albuterol 108 (90 Base) MCG/ACT Aero Soln inhalation aerosol Inhale 1-2 Puffs every four hours as needed for Shortness of Breath. 8 g 5    albuterol (PROVENTIL) 2.5mg/3ml Nebu Soln solution for nebulization Take 3 mL by nebulization every four hours as needed for Shortness of Breath. 540 mL 1     No current facility-administered medications for this visit.     Cephalosporins, Penicillins, Zithromax [azithromycin], Zofran odt, and Sulfa drugs  Past Medical History:   Diagnosis Date    Allergy     Anxiety     Asthma     Depression     GERD (gastroesophageal reflux disease)     History of Kawasaki's disease 1999    Reactive airway disease 3/9/2022    Routine physical examination 9/9/2021        EXAM:  /78 (BP Location: Left arm, Patient Position: Sitting, BP Cuff Size: Adult long)   Pulse 65   Temp 36.7 °C (98.1 °F) (Temporal)   Resp 16   Ht 1.588 m (5' 2.5\")   Wt 92.1 kg (203 lb)   SpO2 97%   General: Alert, no conversational dyspnea or audible wheeze, non-toxic appearance.  Eyes: PERRL, conjunctiva slightly injected, no eye discharge.  Ears: Normal pinnae,TM's normal bilaterally.  Nares: Patent with green mucus.  Sinuses: " tender over maxillary / frontal sinuses.  Throat: Erythematous injection without exudate.   Neck: Supple, with normal cervical nodes.  Lungs: Clear to auscultation bilaterally, no wheeze, crackles or rhonchi.   Heart: Regular rate without murmur.  Skin: Warm and dry without rash.     ASSESSMENT:   1. Sore throat    2. Acute nasopharyngitis       POCT strep negative     PLAN:  1. Doxycycline 100 mg twice a day for 5 days. As symptoms have been worsening over the last week, will treat with antibiotics.  2. Twice daily use of nasal saline rinse or Neti-Pot.  3. OTC anti-pyretics and decongestants as needed.  4. Follow-up in office or urgent care for worsening symptoms, difficulty breathing, lack of expected recovery, or should new symptoms or problems arise.

## 2023-10-23 ENCOUNTER — DOCUMENTATION (OUTPATIENT)
Dept: HEALTH INFORMATION MANAGEMENT | Facility: OTHER | Age: 28
End: 2023-10-23
Payer: COMMERCIAL

## 2024-02-26 ENCOUNTER — TELEPHONE (OUTPATIENT)
Dept: HEALTH INFORMATION MANAGEMENT | Facility: OTHER | Age: 29
End: 2024-02-26
Payer: COMMERCIAL

## 2024-12-25 NOTE — PROGRESS NOTES
Virtual Visit: Established Patient   This visit was conducted via Zoom using secure and encrypted videoconferencing technology.   The patient was in a private location in the state of [place].    The patient's identity was confirmed and verbal consent was obtained for this virtual visit.    Subjective:   CC:   Chief Complaint   Patient presents with   • Cough     congestion    • GI Problem     diarrhea, vomit    • Dizziness     Zaynab Galarza is a 26 y.o. female presenting for evaluation and management of:    Patient reports that she was ill.  She states that she was having nausea abdominal pain and diarrhea.  She states that she was tested at [facility] for covid and it was negative.  Patient states that she has been out of work since Thursday.  She states that she had a fever until 2 days ago.  She states that her temp was .  She states that when she took her temp today it was 97 and 98 off medication.  She states that her temp was 98 yesterday.  Yesterday she took sudafed.  She is needing to be off work until she does not have a temp for 3 days.  She is still somewhat nauseated but has not vomited today.  She states that she is still having pain in the umbilical.  She states that today is dull but yesterday was more intense.   Patient works at [employer].     ROS   Denies any other symptoms unless previously indicated.      Current medicines (including changes today)  Current Outpatient Medications   Medication Sig Dispense Refill   • levoFLOXacin (LEVAQUIN) 250 MG Tab Take 1 Tablet by mouth every day. 3 Tablet 0   • montelukast (SINGULAIR) 10 MG Tab Take 1 Tablet by mouth every day. 90 Tablet 1   • cetirizine (ZYRTEC) 10 MG Tab Take 1 Tablet by mouth every day. 90 Tablet 1   • budesonide-formoterol (SYMBICORT) 80-4.5 MCG/ACT Aerosol Inhale 2 Puffs 2 times a day. 10.2 g 3   • albuterol 108 (90 Base) MCG/ACT Aero Soln inhalation aerosol Inhale 1-2 Puffs every four hours as needed for Shortness of  "Breath. 8 g 3   • albuterol (PROVENTIL) 2.5mg/3ml Nebu Soln solution for nebulization Take 3 mL by nebulization every four hours as needed for Shortness of Breath. 540 mL 3     No current facility-administered medications for this visit.       Patient Active Problem List    Diagnosis Date Noted   • Viral illness 12/09/2021   • Family history of antiphospholipid syndrome 10/20/2021   • History of multiple spontaneous abortions 10/20/2021   • Chronic fatigue 10/20/2021   • Family history of MTHFR deficiency 09/09/2021   • Vitamin D deficiency 09/09/2021   • Intolerance to cold 09/09/2021   • Night sweats 09/09/2021   • Hair loss 09/09/2021   • Routine physical examination 09/09/2021   • MDD (major depressive disorder), recurrent episode, moderate (HCC) 01/28/2020   • PTSD (post-traumatic stress disorder) 11/19/2019   • MONICA (generalized anxiety disorder) 11/19/2019   • Mild persistent asthma with acute exacerbation 10/09/2018   • Obesity (BMI 30-39.9) 09/10/2018        Objective:   Ht 1.575 m (5' 2\") Comment: obtained from chart  Wt 84.8 kg (187 lb) Comment: obtained from chart  BMI 34.20 kg/m²     Physical Exam:  Constitutional: Alert, no distress, well-groomed.  Skin: No rashes in visible areas.  Eye: Round. Conjunctiva clear, lids normal. No icterus.   ENMT: Lips pink without lesions, good dentition, moist mucous membranes. Phonation normal.  Neck: No masses, no thyromegaly. Moves freely without pain.  Respiratory: Unlabored respiratory effort, no cough or audible wheeze  Psych: Alert and oriented x3, normal affect and mood.     Assessment and Plan:   The following treatment plan was discussed:     1. Viral illness    Unable to evaluate as patient is not physically in office.  However it does sound as though her symptoms are improving.  She has had 2 days where her temperature has been to normal and her abdominal pain is improving.  She will send a [employer] message tomorrow.  She will update me with her abdominal " pain symptoms and temperature reading and if normal, will provide a note to return back to work.    Follow-up: No follow-ups on file.          Unknown if ever smoked